# Patient Record
Sex: MALE | Race: WHITE | Employment: OTHER | URBAN - METROPOLITAN AREA
[De-identification: names, ages, dates, MRNs, and addresses within clinical notes are randomized per-mention and may not be internally consistent; named-entity substitution may affect disease eponyms.]

---

## 2018-03-20 ENCOUNTER — HOSPITAL ENCOUNTER (EMERGENCY)
Age: 73
Discharge: HOME | End: 2018-03-20
Attending: EMERGENCY MEDICINE
Payer: MEDICARE

## 2018-03-20 ENCOUNTER — APPOINTMENT (OUTPATIENT)
Dept: CT IMAGING | Age: 73
End: 2018-03-20
Payer: MEDICARE

## 2018-03-20 ENCOUNTER — APPOINTMENT (OUTPATIENT)
Dept: GENERAL RADIOLOGY | Age: 73
End: 2018-03-20
Payer: MEDICARE

## 2018-03-20 VITALS
TEMPERATURE: 98.1 F | BODY MASS INDEX: 28.79 KG/M2 | DIASTOLIC BLOOD PRESSURE: 74 MMHG | RESPIRATION RATE: 16 BRPM | OXYGEN SATURATION: 92 % | HEIGHT: 68 IN | HEART RATE: 77 BPM | WEIGHT: 190 LBS | SYSTOLIC BLOOD PRESSURE: 124 MMHG

## 2018-03-20 DIAGNOSIS — J18.9 PNEUMONIA DUE TO ORGANISM: Primary | ICD-10-CM

## 2018-03-20 LAB
ALBUMIN SERPL-MCNC: 3.9 G/DL (ref 3.5–5.2)
ALP BLD-CCNC: 62 U/L (ref 40–129)
ALT SERPL-CCNC: 14 U/L (ref 0–40)
ANION GAP SERPL CALCULATED.3IONS-SCNC: 12 MMOL/L (ref 7–16)
AST SERPL-CCNC: 18 U/L (ref 0–39)
BASOPHILS ABSOLUTE: 0.06 E9/L (ref 0–0.2)
BASOPHILS RELATIVE PERCENT: 0.8 % (ref 0–2)
BILIRUB SERPL-MCNC: 1.2 MG/DL (ref 0–1.2)
BUN BLDV-MCNC: 19 MG/DL (ref 8–23)
CALCIUM SERPL-MCNC: 8.8 MG/DL (ref 8.6–10.2)
CHLORIDE BLD-SCNC: 98 MMOL/L (ref 98–107)
CO2: 25 MMOL/L (ref 22–29)
CREAT SERPL-MCNC: 1.2 MG/DL (ref 0.7–1.2)
D DIMER: 872 NG/ML DDU
EOSINOPHILS ABSOLUTE: 0.15 E9/L (ref 0.05–0.5)
EOSINOPHILS RELATIVE PERCENT: 2 % (ref 0–6)
GFR AFRICAN AMERICAN: >60
GFR NON-AFRICAN AMERICAN: 60 ML/MIN/1.73
GLUCOSE BLD-MCNC: 89 MG/DL (ref 74–109)
HCT VFR BLD CALC: 45.8 % (ref 37–54)
HEMOGLOBIN: 15.3 G/DL (ref 12.5–16.5)
IMMATURE GRANULOCYTES #: 0.03 E9/L
IMMATURE GRANULOCYTES %: 0.4 % (ref 0–5)
LACTIC ACID: 1 MMOL/L (ref 0.5–2.2)
LYMPHOCYTES ABSOLUTE: 1.17 E9/L (ref 1.5–4)
LYMPHOCYTES RELATIVE PERCENT: 16 % (ref 20–42)
MCH RBC QN AUTO: 29.9 PG (ref 26–35)
MCHC RBC AUTO-ENTMCNC: 33.4 % (ref 32–34.5)
MCV RBC AUTO: 89.6 FL (ref 80–99.9)
MONOCYTES ABSOLUTE: 0.47 E9/L (ref 0.1–0.95)
MONOCYTES RELATIVE PERCENT: 6.4 % (ref 2–12)
NEUTROPHILS ABSOLUTE: 5.44 E9/L (ref 1.8–7.3)
NEUTROPHILS RELATIVE PERCENT: 74.4 % (ref 43–80)
PDW BLD-RTO: 13.3 FL (ref 11.5–15)
PLATELET # BLD: 199 E9/L (ref 130–450)
PMV BLD AUTO: 9.8 FL (ref 7–12)
POTASSIUM SERPL-SCNC: 4 MMOL/L (ref 3.5–5)
RBC # BLD: 5.11 E12/L (ref 3.8–5.8)
SODIUM BLD-SCNC: 135 MMOL/L (ref 132–146)
TOTAL PROTEIN: 6.8 G/DL (ref 6.4–8.3)
WBC # BLD: 7.3 E9/L (ref 4.5–11.5)

## 2018-03-20 PROCEDURE — 71045 X-RAY EXAM CHEST 1 VIEW: CPT

## 2018-03-20 PROCEDURE — 6360000004 HC RX CONTRAST MEDICATION: Performed by: RADIOLOGY

## 2018-03-20 PROCEDURE — 71275 CT ANGIOGRAPHY CHEST: CPT

## 2018-03-20 PROCEDURE — 87040 BLOOD CULTURE FOR BACTERIA: CPT

## 2018-03-20 PROCEDURE — 80053 COMPREHEN METABOLIC PANEL: CPT

## 2018-03-20 PROCEDURE — 83605 ASSAY OF LACTIC ACID: CPT

## 2018-03-20 PROCEDURE — 85378 FIBRIN DEGRADE SEMIQUANT: CPT

## 2018-03-20 PROCEDURE — 99284 EMERGENCY DEPT VISIT MOD MDM: CPT

## 2018-03-20 PROCEDURE — 85025 COMPLETE CBC W/AUTO DIFF WBC: CPT

## 2018-03-20 RX ORDER — LEVOFLOXACIN 750 MG/1
750 TABLET ORAL DAILY
Qty: 10 TABLET | Refills: 0 | Status: SHIPPED | OUTPATIENT
Start: 2018-03-20 | End: 2018-03-30

## 2018-03-20 RX ADMIN — IOPAMIDOL 70 ML: 755 INJECTION, SOLUTION INTRAVENOUS at 16:48

## 2018-03-20 ASSESSMENT — ENCOUNTER SYMPTOMS
COUGH: 1
SINUS PRESSURE: 0
SINUS CONGESTION: 0
EYE DISCHARGE: 0
EYE REDNESS: 0
WHEEZING: 0
VOMITING: 0
EYE PAIN: 0
ABDOMINAL PAIN: 0
DIARRHEA: 0
BACK PAIN: 0
NAUSEA: 0
SORE THROAT: 0
SHORTNESS OF BREATH: 0

## 2018-03-20 NOTE — ED NOTES
Bed: 08  Expected date:   Expected time:   Means of arrival:   Comments:  triage     Nick Austin, RN  03/20/18 3735

## 2018-03-20 NOTE — ED PROVIDER NOTES
negative. Physical Exam   Constitutional: He is oriented to person, place, and time. He appears well-developed and well-nourished. HENT:   Head: Normocephalic and atraumatic. Eyes: Conjunctivae are normal.   Neck: Normal range of motion. Neck supple. Cardiovascular: Normal rate, regular rhythm and normal heart sounds. No murmur heard. Pulmonary/Chest: Breath sounds normal. No respiratory distress. He has no wheezes. He has no rales. Fine crackles L > R     Abdominal: Soft. Bowel sounds are normal. There is no tenderness. There is no rebound and no guarding. Musculoskeletal: He exhibits no edema, tenderness or deformity. Neurological: He is alert and oriented to person, place, and time. No cranial nerve deficit. Coordination normal.   Skin: Skin is warm and dry. Nursing note and vitals reviewed. Procedures    MDM  Number of Diagnoses or Management Options  Pneumonia due to organism:   Diagnosis management comments: 37-year-old male presenting by PCP recommendation for hemoptysis. Primary concern was coronary embolism. Patient's d-dimer was checked and found to be elevated beyond age-adjusted criteria. CT of the chest showed no evidence of point embolism, but did reveal a pneumonia. The patient be treated with Levaquin for this. Labs and vitals are stable during the patient's emergency department course. The patient was given instructions for follow-up and return to the emergency department as needed. ED Course        --------------------------------------------- PAST HISTORY ---------------------------------------------  Past Medical History:  has no past medical history on file. Past Surgical History:  has a past surgical history that includes knee surgery (Bilateral); Rotator cuff repair (Right); and Carpal tunnel release (Bilateral). Social History:  reports that he has quit smoking. He has never used smokeless tobacco. He reports that he drinks alcohol.  He reports that he mouth daily for 10 days       Diagnosis:  1. Pneumonia due to organism        Disposition:  Patient's disposition: Discharge to home  Patient's condition is stable.        Matthew Aponte  Resident  03/20/18 [id number]

## 2018-03-25 LAB
BLOOD CULTURE, ROUTINE: NORMAL
CULTURE, BLOOD 2: NORMAL

## 2020-01-13 ENCOUNTER — HOSPITAL ENCOUNTER (OUTPATIENT)
Dept: GENERAL RADIOLOGY | Age: 75
Discharge: HOME | End: 2020-01-15
Payer: MEDICARE

## 2020-01-13 ENCOUNTER — HOSPITAL ENCOUNTER (OUTPATIENT)
Age: 75
Discharge: HOME | End: 2020-01-15
Payer: MEDICARE

## 2020-01-13 PROCEDURE — 72110 X-RAY EXAM L-2 SPINE 4/>VWS: CPT

## 2020-12-07 ENCOUNTER — OFFICE VISIT (OUTPATIENT)
Dept: ORTHOPEDIC SURGERY | Age: 75
End: 2020-12-07
Payer: MEDICARE

## 2020-12-07 ENCOUNTER — EVALUATION (OUTPATIENT)
Dept: PHYSICAL THERAPY | Age: 75
End: 2020-12-07
Payer: MEDICARE

## 2020-12-07 VITALS — HEIGHT: 68 IN | WEIGHT: 185 LBS | BODY MASS INDEX: 28.04 KG/M2 | TEMPERATURE: 96.9 F

## 2020-12-07 PROBLEM — M19.011 PRIMARY OSTEOARTHRITIS OF RIGHT SHOULDER: Status: ACTIVE | Noted: 2020-12-07

## 2020-12-07 PROBLEM — M17.12 PATELLOFEMORAL ARTHRITIS OF LEFT KNEE: Status: ACTIVE | Noted: 2020-12-07

## 2020-12-07 PROCEDURE — L1810 KO ELASTIC WITH JOINTS: HCPCS | Performed by: ORTHOPAEDIC SURGERY

## 2020-12-07 PROCEDURE — 99204 OFFICE O/P NEW MOD 45 MIN: CPT | Performed by: ORTHOPAEDIC SURGERY

## 2020-12-07 PROCEDURE — 97161 PT EVAL LOW COMPLEX 20 MIN: CPT | Performed by: PHYSICAL THERAPIST

## 2020-12-07 RX ORDER — ACETAMINOPHEN 160 MG
1 TABLET,DISINTEGRATING ORAL DAILY
COMMUNITY

## 2020-12-07 NOTE — PROGRESS NOTES
[address] [facility]   Phone: [phone]     Fax: [phone]    Physical Therapy Initial Evaluation  Date:  2020    Patient Name:  Neel Mabry    :  1945  MRN: [id number]  Referring Physician:  Maynor Cueto Information:  [email or web address]     Evaluation date:  20  Diagnosis:  OA L knee/R shoulder  Treatment diagnosis:  OA Lknee; OA R shoulder  Precautions:  none noted   Cert Dates:  20/3/77  to  3/7/20  ICD-10 Codes:  M25.519, M25.569, R29.90  Evaluating Physical Therapist:  VIJAY Lombardo       Subjective:  History/Onset of Symptoms:  pt presents to therapy with c/o R shoulder/L knee pain due to OA for several yrs of insidious onset; PMH for R shoulder arthoscopy  as well as arthroscopies x 4 L knee with last being ; tells PT he has been wearing brace L  knee for a while and feels better; no c/o N/T or buckling L LE or R UE; MEDS help with both; sleep hampered due to pain; x-ray of both + for OA and pt has been told sx in imminent for R shoulder; RTD for follow-up 21    Pain:  8/10 across R shoulder; 2/10 L knee with all activities       Sensation:  intact B UE/LONDON's     Previous methods of Treatment:  arthroscopic sx R shoulder, L knee x 4     Additional Comments:  pt static/dynamic balance is GOOD/GOOD+      Objective:    AROM:  WNL for all ranges L knee; R shoulder:  flex/abd= 90*/80*, IR/ER= belt/back of head  PROM R shoulder:  flex/abd= 110*/90*, IR/ER= 60*/30*     Strength:  grossly 4/5 for all ranges L hip/knee; grossly 3, 3+/5 across R shoulder all available AROM's      Gait:  normal gait mechanics noted B LONDON's     Additional Comments:  R shoulder:  empty can/drop arm/short abd    -/-/- for weakness       Summary/Assessment:    Pt presents to outpatient physical therapy with decreased AROM/strength R shoulder, decreased strength L hip/knee for all ranges.       Plan:     Below checked are areas for improvement during physical therapy POC:        [x] Pain reduction  [x]  Balance Improvement       [x]  Strengthening  []  Postural Improvement   [x]  Range of Motion  []  Soft Tissue Improvement    []  Gait Training   [x]  Other:  modalities as needed     [x]  Home Exercise Program      Pt will be see for 2 visits per week for 4 weeks for a total of 8 visits to accomplish goals set below:         Goals: (4 weeks)      1. Pain:  decrease pain across L knee, 0-2/10 and R shoulder, 0-4//10 with all prolonged activities    2. Increase strength across L hip/knee to grossly 5/5 and R shoulder to grossly 4-/5 within all available AROM's    3. Improve AROM across R shoulder:  flex/abd= 110*/110*, IR/ER= L5/C4    4. Improve endurance for all prolonged activities to GOOD    5. Assure I with HEP for home management of conditions        Pt's potential for reaching Physical Therapy goals: GOOD with L knee, FAIR/FAIR+ R shoulder. Time In:  1331  Time Out:  [address] [facility], MPT     [address]: [phone]               F:   [phone]     If you have any questions or concerns, please don't hesitate to call. Thank you for your referral.    Physician Signature:________________________________Date:__________________  By signing above, therapists plan is approved by physician. All patients under [employer]   must be signed by physician.

## 2020-12-07 NOTE — PROGRESS NOTES
.kneePhysical Therapy Daily Treatment Note    Date: 2020  Patient Name: Anuj Greer  :    MRN: [id number]    Referring Provider: Flaco Bright MD  [address]     Medical Diagnosis:     OA L knee/R shoulder    Outcome Measure:  Lower Extremity Functional Scale (LEFS) 20-39%% impairment                                      Quick Dash  40-59%  S: See eval  O: Mod edema  Time  8231-8790       Visit   Repeat outcome measure at mid point and end. Pain    8/10 shoulder; 2/10 knee     ROM       Modalities       Ice, compression, elevation      Stretching       Patella mobs      Prone hangs      Heel props      Knee flex stretch-seated      Prone self flexion stretch      Exercise       Nustep       Quad sets      Heel slides      SLR      Marching       Sidekicks      Squat       Step-ups - FWD       Step-ups - LAT      Step-ups - BWD       Step up and over reciprocally       TG squats      TG Calf raises            UBE            pulleys for flex/abd      table st:  flex                      abd                      ER      pendulums      shrugs/scap ret      supine wand flex                   LAQ             NMR For safe ambulation in community and home    Marching gait      Side stepping      Retrowalk      Heel to toe      A: Tolerated well. Above added to written HEP along with instructions for ice and elevation.   P: Continue with rehab plan  Kim Choudhury PT    Treatment Charges: Mins Units   Initial Evaluation 26 1   Re-Evaluation     Ther Exercise         TE     Manual Therapy     MT     Ther Activities        TA     Gait Training          GT     Neuro Re-education NR     Modalities     Non-Billable Service Time     Other     Total Time/Units 26 1

## 2020-12-07 NOTE — PROGRESS NOTES
Chief Complaint:   Chief Complaint   Patient presents with    Knee Pain     Lt knee pain, No recent X-rays. Hx of bilateral knee scopes    Shoulder Pain     Rt shoulder pain, no recent X-rays       HPI   68-year-old man here for evaluation of left knee and right shoulder pain. Both problems are chronic. Left knee variable anterior and medial discomfort. Not taking any significant medications. History of previous knee arthroscopy. No new injuries. Right shoulder pain is chronic. History of arthroscopy 1989 by another surgeon. States that he has had several steroid injections over the years and they were never helpful for him. Patient Active Problem List   Diagnosis    Patellofemoral arthritis of left knee    Primary osteoarthritis of right shoulder       History reviewed. No pertinent past medical history. Past Surgical History:   Procedure Laterality Date    CARPAL TUNNEL RELEASE Bilateral     KNEE SURGERY Bilateral     ROTATOR CUFF REPAIR Right        Current Outpatient Medications   Medication Sig Dispense Refill    Multiple Vitamins-Minerals (MULTIVITAMIN ADULT PO) Take 1 capsule by mouth daily      ZINC PO Take 1 capsule by mouth daily      Cholecalciferol (VITAMIN D3) 50 MCG (2000 UT) CAPS Take 1 capsule by mouth daily       No current facility-administered medications for this visit.         No Known Allergies    Social History     Socioeconomic History    Marital status:      Spouse name: None    Number of children: None    Years of education: None    Highest education level: None   Occupational History    None   Social Needs    Financial resource strain: None    Food insecurity     Worry: None     Inability: None    Transportation needs     Medical: None     Non-medical: None   Tobacco Use    Smoking status: Former Smoker    Smokeless tobacco: Never Used   Substance and Sexual Activity    Alcohol use: Yes     Comment: rarely    Drug use: No    Sexual activity: None   Lifestyle    Physical activity     Days per week: None     Minutes per session: None    Stress: None   Relationships    Social connections     Talks on phone: None     Gets together: None     Attends Scientologist service: None     Active member of club or organization: None     Attends meetings of clubs or organizations: None     Relationship status: None    Intimate partner violence     Fear of current or ex partner: None     Emotionally abused: None     Physically abused: None     Forced sexual activity: None   Other Topics Concern    None   Social History Narrative    None       History reviewed. No pertinent family history. Review of Systems   No fever, chills, or other constitutionalsymptoms. No numbness or other neuro symptoms. No chest pain. No dyspnea. [unfilled]   Left knee has good overall alignment. No acute effusion. Anterior and medial discomfort with full range of motion. No pain with left hip rotation. Right shoulder has no effusion or acute erythema. He is significantly limited in motion to about 80 degrees of flexion, 75 degrees of abduction. Internal rotation to the posterior lateral hip. External rotation 30 degrees. Gross abduction strength 5/5. Physical Exam    Patient is alert and oriented. Well-developed well-nourished. BMI 28. Pupils equal and reactive. Scleraeanicteric. Neck supple  Lungs clear. Cardiac rate and rhythm regular. Abdomen soft and nontender. Skin warm and dry. XRAY:   Knee x-rays today including bilateral standing AP flexion weightbearing and lateral view of the left knee. Left knee demonstrates stage III patellofemoral degenerative change with narrowing osteophyte formation and patellar sclerosis. There is very mild medial joint space wearing in both the AP and flexion weightbearing views. Right knee is relatively preserved. Impression: Patellofemoral arthritis left knee.     AP and lateral x-ray views right shoulder

## 2020-12-17 ENCOUNTER — TREATMENT (OUTPATIENT)
Dept: PHYSICAL THERAPY | Age: 75
End: 2020-12-17
Payer: MEDICARE

## 2020-12-17 PROCEDURE — 97110 THERAPEUTIC EXERCISES: CPT | Performed by: PHYSICAL THERAPIST

## 2020-12-17 PROCEDURE — 97530 THERAPEUTIC ACTIVITIES: CPT | Performed by: PHYSICAL THERAPIST

## 2021-01-28 ENCOUNTER — OFFICE VISIT (OUTPATIENT)
Dept: ORTHOPEDIC SURGERY | Age: 76
End: 2021-01-28
Payer: MEDICARE

## 2021-01-28 VITALS — WEIGHT: 185 LBS | TEMPERATURE: 97.1 F | RESPIRATION RATE: 20 BRPM | BODY MASS INDEX: 27.4 KG/M2 | HEIGHT: 69 IN

## 2021-01-28 DIAGNOSIS — G89.29 CHRONIC RIGHT SHOULDER PAIN: Primary | ICD-10-CM

## 2021-01-28 DIAGNOSIS — M19.011 PRIMARY OSTEOARTHRITIS OF RIGHT SHOULDER: ICD-10-CM

## 2021-01-28 DIAGNOSIS — M25.511 CHRONIC RIGHT SHOULDER PAIN: Primary | ICD-10-CM

## 2021-01-28 PROCEDURE — 99214 OFFICE O/P EST MOD 30 MIN: CPT | Performed by: ORTHOPAEDIC SURGERY

## 2021-01-28 NOTE — PROGRESS NOTES
Department of Orthopedic Surgery  [facility] Bella Ambriz MD  History and Physical      CHIEF COMPLAINT: Right shoulder pain    HISTORY OF PRESENT ILLNESS:                The patient is a 70 y.o. male who presents with persistent recalcitrant right shoulder pain. Patient reports a several year history of worsening right shoulder pain. He is now reporting loss of range of motion in addition to his pain. Has had multiple cortisone injections into his right shoulder without any significant pain improvement. Pain is worsened with activities particular overhead lifting. He is avoiding certain activities such as gun shooting which he enjoys secondary the pain in the shoulder. 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 Reports no heavy repetitive use of the shoulder. He has not had any other treatments. He does have a history of a right previous rotator cuff repair which she reports persistent pain afterwards. .    Past Medical History:    History reviewed. No pertinent past medical history. Past Surgical History:        Procedure Laterality Date    CARPAL TUNNEL RELEASE Bilateral     KNEE SURGERY Bilateral     ROTATOR CUFF REPAIR Right      Current Medications:   No current facility-administered medications for this visit. Allergies:  Patient has no known allergies. Social History:   TOBACCO:   reports that he has quit smoking. He has never used smokeless tobacco.  ETOH:   reports current alcohol use. DRUGS:   reports no history of drug use. ACTIVITIES OF DAILY LIVING:    OCCUPATION:    Family History:   History reviewed. No pertinent family history.     REVIEW OF SYSTEMS:  CONSTITUTIONAL:  negative  EYES:  negative  HEENT:  negative  RESPIRATORY:  negative  CARDIOVASCULAR:  negative  GASTROINTESTINAL:  negative  GENITOURINARY:  negative  INTEGUMENT/BREAST:  negative  HEMATOLOGIC/LYMPHATIC:  negative  ALLERGIC/IMMUNOLOGIC:  negative  ENDOCRINE:  negative  MUSCULOSKELETAL: Right shoulder arthritis and pain  NEUROLOGICAL:  negative  BEHAVIOR/PSYCH:  negative    PHYSICAL EXAM:    VITALS:  Temp 97.1 °F (36.2 °C) (Infrared)   Resp 20   Ht 5' 9\" (1.753 m)   Wt 185 lb (83.9 kg)   BMI 27.32 kg/m²   CONSTITUTIONAL:  awake, alert, cooperative, no apparent distress, and appears stated age  EYES:  Lids and lashes normal, pupils equal, round and reactive to light, extra ocular muscles intact, sclera clear, conjunctiva normal  ENT:  Normocephalic, without obvious abnormality, atraumatic, sinuses nontender on palpation, external ears without lesions, oral pharynx with moist mucus membranes, tonsils without erythema or exudates, gums normal and good dentition. NECK:  Supple, symmetrical, trachea midline, no adenopathy, thyroid symmetric, not enlarged and no tenderness, skin normal  LUNGS:  CTA  CARDIOVASCULAR:  RRR  ABDOMEN:  Soft,nttp  CHEST/BREASTS:  deferred  GENITAL/URINARY:  deferred  NEUROLOGIC:  Awake, alert, oriented to name, place and time. Cranial nerves II-XII are grossly intact. Motor is 5 out of 5 bilaterally. Sensory is intact. gait is normal.  MUSCULOSKELETAL:    Right upper extremity: Diffusely tender to palpation anterior and lateral aspect of the shoulder. External rotation 0 degrees. Internal rotation to the lateral thigh. Forward elevation limited to 80 degrees. Abduction 60 degrees. Negative drop arm test.  Nontender about the elbow and wrist.  Radial, ulnar, median and axillary nerve sensation intact. 5/5 deltoid, biceps, tricep, wrist flexion extensors digital intrinsics. 2+ radial pulse.   Brisk cap refill of digits    DATA:    CBC:   Lab Results   Component Value Date    WBC 7.3 03/20/2018    RBC 5.11 03/20/2018    HGB 15.3 03/20/2018    HCT 45.8 03/20/2018    MCV 89.6 03/20/2018    MCH 29.9 03/20/2018    MCHC 33.4 03/20/2018    RDW 13.3 03/20/2018     03/20/2018    MPV 9.8 03/20/2018     PT/INR:  No results found for: PROTIME, INR    Radiology Review: X-rays of the right shoulder AP and axillary views demonstrate advanced primary glenohumeral joint arthritis    IMPRESSION:  · End-stage right shoulder glenohumeral joint arthritis  · History of previous rotator cuff repair right shoulder      PLAN:  Today's findings were explained the patient. I did use bone models to demonstrate total shoulder arthroplasty. Explained surgical intervention in detail. After discussion he would like to proceed with surgical invention. Plan for right total shoulder arthroplasty with convertible glenoid. Discussed possible need for reverse total shoulder arthroplasty depending on the nature of his rotator cuff at the time of his surgery. Postoperative course and expectations were outlined. All questions answered      I explained the risks, benefits, alternatives and complications of surgery with the patient including but not limited to the risks of infection, possible damage to nerves, vessels, or tendons, stiffness, loss of range of motion, scar sensitivity, wound healing complications, worsening symptoms, periprosthetic fractures and/or dislocations, possible need for therapy, as well as the possible need further surgery and unanticipated complications. The patient voiced understanding and all questions were answered. The patient elected to proceed with surgical intervention.      Ashok Lui  1/28/2021

## 2021-01-31 ENCOUNTER — HOSPITAL ENCOUNTER (OUTPATIENT)
Dept: CT IMAGING | Age: 76
Discharge: HOME | End: 2021-02-02
Payer: MEDICARE

## 2021-01-31 DIAGNOSIS — G89.29 CHRONIC RIGHT SHOULDER PAIN: ICD-10-CM

## 2021-01-31 DIAGNOSIS — M25.511 CHRONIC RIGHT SHOULDER PAIN: ICD-10-CM

## 2021-01-31 PROCEDURE — 73200 CT UPPER EXTREMITY W/O DYE: CPT

## 2021-02-17 ENCOUNTER — PREP FOR PROCEDURE (OUTPATIENT)
Dept: ORTHOPEDIC SURGERY | Age: 76
End: 2021-02-17

## 2021-02-17 RX ORDER — SODIUM CHLORIDE 0.9 % (FLUSH) 0.9 %
10 SYRINGE (ML) INJECTION PRN
Status: CANCELLED | OUTPATIENT
Start: 2021-02-17

## 2021-02-17 RX ORDER — SODIUM CHLORIDE 9 MG/ML
INJECTION, SOLUTION INTRAVENOUS CONTINUOUS
Status: CANCELLED | OUTPATIENT
Start: 2021-02-17

## 2021-02-17 RX ORDER — SODIUM CHLORIDE 0.9 % (FLUSH) 0.9 %
10 SYRINGE (ML) INJECTION EVERY 12 HOURS SCHEDULED
Status: CANCELLED | OUTPATIENT
Start: 2021-02-17

## 2021-02-25 ENCOUNTER — IMMUNIZATION (OUTPATIENT)
Dept: PRIMARY CARE CLINIC | Age: 76
End: 2021-02-25
Payer: MEDICARE

## 2021-02-25 PROCEDURE — 0001A COVID-19, PFIZER VACCINE 30MCG/0.3ML DOSE: CPT | Performed by: PHYSICIAN ASSISTANT

## 2021-02-25 PROCEDURE — 91300 COVID-19, PFIZER VACCINE 30MCG/0.3ML DOSE: CPT | Performed by: PHYSICIAN ASSISTANT

## 2021-03-18 ENCOUNTER — IMMUNIZATION (OUTPATIENT)
Dept: PRIMARY CARE CLINIC | Age: 76
End: 2021-03-18
Payer: MEDICARE

## 2021-03-18 PROCEDURE — 91300 COVID-19, PFIZER VACCINE 30MCG/0.3ML DOSE: CPT | Performed by: PHYSICIAN ASSISTANT

## 2021-03-18 PROCEDURE — 0002A COVID-19, PFIZER VACCINE 30MCG/0.3ML DOSE: CPT | Performed by: PHYSICIAN ASSISTANT

## 2021-03-19 ENCOUNTER — HOSPITAL ENCOUNTER (OUTPATIENT)
Dept: PREADMISSION TESTING | Age: 76
Discharge: HOME | End: 2021-03-19
Payer: MEDICARE

## 2021-03-19 ENCOUNTER — HOSPITAL ENCOUNTER (OUTPATIENT)
Age: 76
Discharge: HOME | End: 2021-03-21
Payer: MEDICARE

## 2021-03-19 ENCOUNTER — ANESTHESIA EVENT (OUTPATIENT)
Dept: OPERATING ROOM | Age: 76
End: 2021-03-19
Payer: MEDICARE

## 2021-03-19 VITALS
TEMPERATURE: 97.5 F | HEIGHT: 68 IN | HEART RATE: 70 BPM | OXYGEN SATURATION: 96 % | DIASTOLIC BLOOD PRESSURE: 76 MMHG | WEIGHT: 185 LBS | RESPIRATION RATE: 18 BRPM | SYSTOLIC BLOOD PRESSURE: 142 MMHG | BODY MASS INDEX: 28.04 KG/M2

## 2021-03-19 DIAGNOSIS — Z01.818 PREOP TESTING: ICD-10-CM

## 2021-03-19 LAB
HCT VFR BLD CALC: 43.6 % (ref 37–54)
HEMOGLOBIN: 14.4 G/DL (ref 12.5–16.5)
MCH RBC QN AUTO: 30.1 PG (ref 26–35)
MCHC RBC AUTO-ENTMCNC: 33 % (ref 32–34.5)
MCV RBC AUTO: 91.2 FL (ref 80–99.9)
PDW BLD-RTO: 12.9 FL (ref 11.5–15)
PLATELET # BLD: 199 E9/L (ref 130–450)
PMV BLD AUTO: 9.7 FL (ref 7–12)
RBC # BLD: 4.78 E12/L (ref 3.8–5.8)
WBC # BLD: 6 E9/L (ref 4.5–11.5)

## 2021-03-19 PROCEDURE — 36415 COLL VENOUS BLD VENIPUNCTURE: CPT

## 2021-03-19 PROCEDURE — 85027 COMPLETE CBC AUTOMATED: CPT

## 2021-03-19 PROCEDURE — 87081 CULTURE SCREEN ONLY: CPT

## 2021-03-19 PROCEDURE — U0003 INFECTIOUS AGENT DETECTION BY NUCLEIC ACID (DNA OR RNA); SEVERE ACUTE RESPIRATORY SYNDROME CORONAVIRUS 2 (SARS-COV-2) (CORONAVIRUS DISEASE [COVID-19]), AMPLIFIED PROBE TECHNIQUE, MAKING USE OF HIGH THROUGHPUT TECHNOLOGIES AS DESCRIBED BY CMS-2020-01-R: HCPCS

## 2021-03-19 RX ORDER — DEXAMETHASONE SODIUM PHOSPHATE 10 MG/ML
4 INJECTION, SOLUTION INTRAMUSCULAR; INTRAVENOUS ONCE
Status: CANCELLED | OUTPATIENT
Start: 2021-03-19 | End: 2021-03-19

## 2021-03-19 RX ORDER — ROPIVACAINE HYDROCHLORIDE 5 MG/ML
30 INJECTION, SOLUTION EPIDURAL; INFILTRATION; PERINEURAL
Status: CANCELLED | OUTPATIENT
Start: 2021-03-19 | End: 2021-03-19

## 2021-03-19 RX ORDER — FENTANYL CITRATE 50 UG/ML
100 INJECTION, SOLUTION INTRAMUSCULAR; INTRAVENOUS ONCE
Status: CANCELLED | OUTPATIENT
Start: 2021-03-19 | End: 2021-03-19

## 2021-03-19 RX ORDER — MIDAZOLAM HYDROCHLORIDE 2 MG/2ML
1 INJECTION, SOLUTION INTRAMUSCULAR; INTRAVENOUS EVERY 5 MIN PRN
Status: CANCELLED | OUTPATIENT
Start: 2021-03-19

## 2021-03-19 RX ORDER — LIDOCAINE HYDROCHLORIDE 10 MG/ML
10 INJECTION, SOLUTION INFILTRATION; PERINEURAL
Status: CANCELLED | OUTPATIENT
Start: 2021-03-19 | End: 2021-03-19

## 2021-03-19 ASSESSMENT — PAIN DESCRIPTION - ORIENTATION: ORIENTATION: RIGHT

## 2021-03-19 ASSESSMENT — PAIN DESCRIPTION - FREQUENCY: FREQUENCY: CONTINUOUS

## 2021-03-19 ASSESSMENT — PAIN SCALES - GENERAL: PAINLEVEL_OUTOF10: 5

## 2021-03-19 ASSESSMENT — PAIN DESCRIPTION - LOCATION: LOCATION: SHOULDER

## 2021-03-19 ASSESSMENT — PAIN DESCRIPTION - ONSET: ONSET: AWAKENED FROM SLEEP

## 2021-03-19 ASSESSMENT — PAIN DESCRIPTION - PAIN TYPE: TYPE: CHRONIC PAIN

## 2021-03-19 NOTE — ANESTHESIA PRE PROCEDURE
Department of Anesthesiology  Preprocedure Note       Name:  Osiris Scott. Age:  70 y.o.  :  1945                                          MRN:  [id number]         Date:  3/19/2021      Surgeon: Eloy Early):  Krystina Daly MD    Procedure: Procedure(s):  RIGHT TOTAL SHOULDER ARTHROPLASTY POSSIBLE REVERSE   +++BIOMET+++    Medications prior to admission:   Prior to Admission medications    Medication Sig Start Date End Date Taking? Authorizing Provider   Multiple Vitamins-Minerals (MULTIVITAMIN ADULT PO) Take 1 capsule by mouth daily   Yes Historical Provider, MD   ZINC PO Take 1 capsule by mouth daily   Yes Historical Provider, MD   Cholecalciferol (VITAMIN D3) 50 MCG ( UT) CAPS Take 1 capsule by mouth daily   Yes Historical Provider, MD       Current medications:    Current Outpatient Medications   Medication Sig Dispense Refill    Multiple Vitamins-Minerals (MULTIVITAMIN ADULT PO) Take 1 capsule by mouth daily      ZINC PO Take 1 capsule by mouth daily      Cholecalciferol (VITAMIN D3) 50 MCG ( UT) CAPS Take 1 capsule by mouth daily       No current facility-administered medications for this encounter.         Allergies:  No Known Allergies    Problem List:    Patient Active Problem List   Diagnosis Code    Patellofemoral arthritis of left knee M17.12    Primary osteoarthritis of right shoulder M19.011       Past Medical History:        Diagnosis Date    Shoulder pain, right     For OR 3-24-21       Past Surgical History:        Procedure Laterality Date    CARPAL TUNNEL RELEASE Bilateral     KNEE SURGERY Bilateral     ROTATOR CUFF REPAIR Right     TONSILLECTOMY         Social History:    Social History     Tobacco Use    Smoking status: Former Smoker     Packs/day: 1.00     Years: 20.00     Pack years: 20.00     Types: Cigarettes     Quit date:      Years since quittin.2    Smokeless tobacco: Never Used   Substance Use Topics    Alcohol use: Yes     Comment: rarely                                Counseling given: Not Answered      Vital Signs (Current):   Vitals:    03/19/21 0952   BP: (!) 142/76   Pulse: 70   Resp: 18   Temp: 97.5 °F (36.4 °C)   TempSrc: Infrared   SpO2: 96%   Weight: 185 lb (83.9 kg)   Height: 5' 8\" (1.727 m)                                              BP Readings from Last 3 Encounters:   03/19/21 (!) 142/76   03/20/18 124/74       NPO Status:                                                                                 BMI:   Wt Readings from Last 3 Encounters:   03/19/21 185 lb (83.9 kg)   01/28/21 185 lb (83.9 kg)   12/07/20 185 lb (83.9 kg)     Body mass index is 28.13 kg/m². CBC:   Lab Results   Component Value Date    WBC 6.0 03/19/2021    RBC 4.78 03/19/2021    HGB 14.4 03/19/2021    HCT 43.6 03/19/2021    MCV 91.2 03/19/2021    RDW 12.9 03/19/2021     03/19/2021       CMP:   Lab Results   Component Value Date     03/20/2018    K 4.0 03/20/2018    CL 98 03/20/2018    CO2 25 03/20/2018    BUN 19 03/20/2018    CREATININE 1.2 03/20/2018    GFRAA >60 03/20/2018    LABGLOM 60 03/20/2018    GLUCOSE 89 03/20/2018    PROT 6.8 03/20/2018    CALCIUM 8.8 03/20/2018    BILITOT 1.2 03/20/2018    ALKPHOS 62 03/20/2018    AST 18 03/20/2018    ALT 14 03/20/2018       POC Tests: No results for input(s): POCGLU, POCNA, POCK, POCCL, POCBUN, POCHEMO, POCHCT in the last 72 hours.     Coags: No results found for: PROTIME, INR, APTT    HCG (If Applicable): No results found for: PREGTESTUR, PREGSERUM, HCG, HCGQUANT     ABGs: No results found for: PHART, PO2ART, BOV2URM, KBI1UPU, BEART, L6IUULWV     Type & Screen (If Applicable):  No results found for: LABABO, LABRH    Drug/Infectious Status (If Applicable):  No results found for: HIV, HEPCAB    COVID-19 Screening (If Applicable): No results found for: COVID19        Anesthesia Evaluation  Patient summary reviewed no history of anesthetic complications:   Airway: Mallampati: II  TM distance: >3 FB

## 2021-03-19 NOTE — PROGRESS NOTES
[facility] PRE-ADMISSION TESTING INSTRUCTIONS    The Preadmission Testing patient is instructed accordingly using the following criteria (check applicable):    ARRIVAL INSTRUCTIONS:  [x] Parking the day of Surgery is located in the Main Entrance lot. Upon entering the door, make an immediate right to the surgery reception desk    [x] Bring photo ID and insurance card    [] Bring in a copy of Living will or Durable Power of  papers. [x] Please be sure to arrange transportation to and from the hospital    [x] Please arrange for someone to be with you the remainder of the day due to having anesthesia      GENERAL INSTRUCTIONS:    [x] Nothing by mouth after midnight, including gum, candy, mints or water    [x] You may brush your teeth, but do not swallow any water    [] Take medications as instructed with 1-2 oz of water    [x] Stop herbal supplements and vitamins 5 days prior to procedure    [x] Follow preop dosing of blood thinners per physician instructions    [] Do not take insulin or oral diabetic medications    [] If diabetic and have low blood sugar or feel symptomatic, take 1-2oz apple juice or glucose tablets    [] Bring inhalers day of surgery    [] Bring C-PAP/ Bi-Pap day of surgery    [] Bring urine specimen day of surgery    [] Antibacterial Soap shower or bath AM of Surgery, no lotion, powders or creams to surgical site    [] Follow bowel prep as instructed per surgeon    [] No tobacco products within 24 hours of surgery     [x] No alcohol or illegal drug use within 24 hours of surgery.     [x] Jewelry, body piercing's, eyeglasses, contact lenses and dentures are not permitted into surgery (bring cases)      [] Please do not wear any nail polish or make up on the day of surgery    [x] If not already done, you can expect a call from registration    [x] If surgeon requests a time change you will be notified the day prior to surgery    [x] If you receive a survey after surgery we would greatly appreciate your comments    [] Parent/guardian of a minor must accompany their child and remain on the premises  the entire time they are under our care     [] Pediatric patients may bring favorite toy, blanket or comfort item with them    [] A caregiver or family member must remain with the patient during their stay if they are mentally handicapped, have dementia, disoriented or unable to use a call light or would be a safety concern if left unattended    [x] Please notify surgeon if you develop any illness between now and time of surgery (cold, cough, sore throat, fever, nausea, vomiting) or any signs of infections  including skin, wounds, and dental.    [] Other instructions    EDUCATIONAL MATERIALS PROVIDED:    [x] PAT Preoperative Education Packet/Booklet     [x] Medication List    [] Fluoroscopy Information Pamphlet    [] Transfusion bracelet applied with instructions    [] Joint replacement video reviewed    [x] Shower with antibacterial soap and use CHG wipes provided the evening before surgery as instructed        Have you been tested for COVID  No           Have you been told you were positive for COVID No  Have you had any known exposure to someone that is positive for COVID No  Do you have a cough                   No              Do you have shortness of breath No                 Do you have a sore throat            No                Are you having chills                    No                Are you having muscle aches. No                    Please come to the hospital wearing a mask and have your significant other wear a mask as well. Both of you should check your temperature before leaving to come here,  if it is 100 or higher please call [phone] for instruction.

## 2021-03-20 LAB
MRSA CULTURE ONLY: NORMAL
SARS-COV-2, PCR: NOT DETECTED

## 2021-03-24 ENCOUNTER — ANESTHESIA (OUTPATIENT)
Dept: OPERATING ROOM | Age: 76
End: 2021-03-24
Payer: MEDICARE

## 2021-03-24 ENCOUNTER — HOSPITAL ENCOUNTER (OUTPATIENT)
Age: 76
Setting detail: OUTPATIENT SURGERY
Discharge: HOME | End: 2021-03-24
Attending: ORTHOPAEDIC SURGERY | Admitting: ORTHOPAEDIC SURGERY
Payer: MEDICARE

## 2021-03-24 VITALS
BODY MASS INDEX: 28.04 KG/M2 | HEART RATE: 78 BPM | WEIGHT: 185 LBS | SYSTOLIC BLOOD PRESSURE: 133 MMHG | TEMPERATURE: 96.8 F | HEIGHT: 68 IN | RESPIRATION RATE: 16 BRPM | OXYGEN SATURATION: 96 % | DIASTOLIC BLOOD PRESSURE: 66 MMHG

## 2021-03-24 VITALS
RESPIRATION RATE: 1 BRPM | SYSTOLIC BLOOD PRESSURE: 126 MMHG | DIASTOLIC BLOOD PRESSURE: 73 MMHG | OXYGEN SATURATION: 94 %

## 2021-03-24 DIAGNOSIS — Z01.818 PREOP TESTING: Primary | ICD-10-CM

## 2021-03-24 DIAGNOSIS — G89.18 POST-OP PAIN: ICD-10-CM

## 2021-03-24 PROCEDURE — 88304 TISSUE EXAM BY PATHOLOGIST: CPT

## 2021-03-24 PROCEDURE — 3600000015 HC SURGERY LEVEL 5 ADDTL 15MIN: Performed by: ORTHOPAEDIC SURGERY

## 2021-03-24 PROCEDURE — 6360000002 HC RX W HCPCS: Performed by: ANESTHESIOLOGY

## 2021-03-24 PROCEDURE — 64415 NJX AA&/STRD BRCH PLXS IMG: CPT | Performed by: ANESTHESIOLOGY

## 2021-03-24 PROCEDURE — 88311 DECALCIFY TISSUE: CPT

## 2021-03-24 PROCEDURE — 6360000002 HC RX W HCPCS: Performed by: ORTHOPAEDIC SURGERY

## 2021-03-24 PROCEDURE — 6360000002 HC RX W HCPCS: Performed by: PHYSICIAN ASSISTANT

## 2021-03-24 PROCEDURE — 23472 RECONSTRUCT SHOULDER JOINT: CPT | Performed by: ORTHOPAEDIC SURGERY

## 2021-03-24 PROCEDURE — 6360000002 HC RX W HCPCS: Performed by: NURSE ANESTHETIST, CERTIFIED REGISTERED

## 2021-03-24 PROCEDURE — 2500000003 HC RX 250 WO HCPCS: Performed by: ANESTHESIOLOGY

## 2021-03-24 PROCEDURE — 3700000001 HC ADD 15 MINUTES (ANESTHESIA): Performed by: ORTHOPAEDIC SURGERY

## 2021-03-24 PROCEDURE — 2500000003 HC RX 250 WO HCPCS: Performed by: ORTHOPAEDIC SURGERY

## 2021-03-24 PROCEDURE — L3650 SO 8 ABD RESTRAINT PRE OTS: HCPCS | Performed by: ORTHOPAEDIC SURGERY

## 2021-03-24 PROCEDURE — 7100000010 HC PHASE II RECOVERY - FIRST 15 MIN: Performed by: ORTHOPAEDIC SURGERY

## 2021-03-24 PROCEDURE — 3700000000 HC ANESTHESIA ATTENDED CARE: Performed by: ORTHOPAEDIC SURGERY

## 2021-03-24 PROCEDURE — 3600000005 HC SURGERY LEVEL 5 BASE: Performed by: ORTHOPAEDIC SURGERY

## 2021-03-24 PROCEDURE — 2500000003 HC RX 250 WO HCPCS: Performed by: NURSE ANESTHETIST, CERTIFIED REGISTERED

## 2021-03-24 PROCEDURE — 7100000001 HC PACU RECOVERY - ADDTL 15 MIN: Performed by: ORTHOPAEDIC SURGERY

## 2021-03-24 PROCEDURE — C1776 JOINT DEVICE (IMPLANTABLE): HCPCS | Performed by: ORTHOPAEDIC SURGERY

## 2021-03-24 PROCEDURE — 2720000010 HC SURG SUPPLY STERILE: Performed by: ORTHOPAEDIC SURGERY

## 2021-03-24 PROCEDURE — 7100000000 HC PACU RECOVERY - FIRST 15 MIN: Performed by: ORTHOPAEDIC SURGERY

## 2021-03-24 PROCEDURE — 2580000003 HC RX 258: Performed by: NURSE ANESTHETIST, CERTIFIED REGISTERED

## 2021-03-24 PROCEDURE — 2720000002 HC MISC SURG SUPPLY STERILE >$500: Performed by: ORTHOPAEDIC SURGERY

## 2021-03-24 PROCEDURE — 2709999900 HC NON-CHARGEABLE SUPPLY: Performed by: ORTHOPAEDIC SURGERY

## 2021-03-24 PROCEDURE — 7100000011 HC PHASE II RECOVERY - ADDTL 15 MIN: Performed by: ORTHOPAEDIC SURGERY

## 2021-03-24 PROCEDURE — C1713 ANCHOR/SCREW BN/BN,TIS/BN: HCPCS | Performed by: ORTHOPAEDIC SURGERY

## 2021-03-24 DEVICE — SCREW BNE L25MM DIA4.75MM HD DIA3.5MM CORT FIX ANG: Type: IMPLANTABLE DEVICE | Site: SHOULDER | Status: FUNCTIONAL

## 2021-03-24 DEVICE — STEM HUM 14MM MINI SHLDR CO CHROM COMPHSVE REV PRI CEM: Type: IMPLANTABLE DEVICE | Site: SHOULDER | Status: FUNCTIONAL

## 2021-03-24 DEVICE — IMPLANTABLE DEVICE: Type: IMPLANTABLE DEVICE | Site: SHOULDER | Status: FUNCTIONAL

## 2021-03-24 DEVICE — COMP REV AUG MI BSPLT W T: Type: IMPLANTABLE DEVICE | Site: SHOULDER | Status: FUNCTIONAL

## 2021-03-24 DEVICE — SCREW BNE L30MM DIA4.75MM HD DIA3.5MM CORT FIX ANG: Type: IMPLANTABLE DEVICE | Site: SHOULDER | Status: FUNCTIONAL

## 2021-03-24 DEVICE — SPHERE GLEN DIA36MM REG STD CO CHROM TAPR FIT FOR COMPHSVE: Type: IMPLANTABLE DEVICE | Site: SHOULDER | Status: FUNCTIONAL

## 2021-03-24 DEVICE — SCREW BNE L15MM DIA4.75MM HD DIA3.5MM CORT FIX ANG: Type: IMPLANTABLE DEVICE | Site: SHOULDER | Status: FUNCTIONAL

## 2021-03-24 DEVICE — SCREW BNE L30MM DIA6.5MM HEX HD DIA3.5MM FOR COMPHSVE CONV: Type: IMPLANTABLE DEVICE | Site: SHOULDER | Status: FUNCTIONAL

## 2021-03-24 DEVICE — IMPLANT HMRL TY +3 STD: Type: IMPLANTABLE DEVICE | Site: SHOULDER | Status: FUNCTIONAL

## 2021-03-24 DEVICE — SCREW BNE L20MM DIA4.75MM HD DIA3.5MM CORT FIX ANG: Type: IMPLANTABLE DEVICE | Site: SHOULDER | Status: FUNCTIONAL

## 2021-03-24 DEVICE — ANCHOR SUT NO2 DIA2.9MM MAXBRAID DBL LD SFT W/ TAPR NDL: Type: IMPLANTABLE DEVICE | Site: SHOULDER | Status: FUNCTIONAL

## 2021-03-24 DEVICE — BEARING HUM STD 36 MM SHLDR VIVACIT-E PROLONG COMPHSVE: Type: IMPLANTABLE DEVICE | Site: SHOULDER | Status: FUNCTIONAL

## 2021-03-24 RX ORDER — FENTANYL CITRATE 50 UG/ML
100 INJECTION, SOLUTION INTRAMUSCULAR; INTRAVENOUS ONCE
Status: DISCONTINUED | OUTPATIENT
Start: 2021-03-24 | End: 2021-03-24 | Stop reason: HOSPADM

## 2021-03-24 RX ORDER — ONDANSETRON 2 MG/ML
INJECTION INTRAMUSCULAR; INTRAVENOUS PRN
Status: DISCONTINUED | OUTPATIENT
Start: 2021-03-24 | End: 2021-03-24 | Stop reason: SDUPTHER

## 2021-03-24 RX ORDER — LIDOCAINE HYDROCHLORIDE 10 MG/ML
10 INJECTION, SOLUTION INFILTRATION; PERINEURAL
Status: COMPLETED | OUTPATIENT
Start: 2021-03-24 | End: 2021-03-24

## 2021-03-24 RX ORDER — DEXAMETHASONE SODIUM PHOSPHATE 10 MG/ML
4 INJECTION, SOLUTION INTRAMUSCULAR; INTRAVENOUS ONCE
Status: COMPLETED | OUTPATIENT
Start: 2021-03-24 | End: 2021-03-24

## 2021-03-24 RX ORDER — SODIUM CHLORIDE 0.9 % (FLUSH) 0.9 %
10 SYRINGE (ML) INJECTION PRN
Status: DISCONTINUED | OUTPATIENT
Start: 2021-03-24 | End: 2021-03-24 | Stop reason: HOSPADM

## 2021-03-24 RX ORDER — EPHEDRINE SULFATE/0.9% NACL/PF 50 MG/5 ML
SYRINGE (ML) INTRAVENOUS PRN
Status: DISCONTINUED | OUTPATIENT
Start: 2021-03-24 | End: 2021-03-24 | Stop reason: SDUPTHER

## 2021-03-24 RX ORDER — FENTANYL CITRATE 50 UG/ML
INJECTION, SOLUTION INTRAMUSCULAR; INTRAVENOUS PRN
Status: DISCONTINUED | OUTPATIENT
Start: 2021-03-24 | End: 2021-03-24 | Stop reason: SDUPTHER

## 2021-03-24 RX ORDER — NEOSTIGMINE METHYLSULFATE 1 MG/ML
INJECTION, SOLUTION INTRAVENOUS PRN
Status: DISCONTINUED | OUTPATIENT
Start: 2021-03-24 | End: 2021-03-24 | Stop reason: SDUPTHER

## 2021-03-24 RX ORDER — SODIUM CHLORIDE 9 MG/ML
INJECTION, SOLUTION INTRAVENOUS CONTINUOUS PRN
Status: DISCONTINUED | OUTPATIENT
Start: 2021-03-24 | End: 2021-03-24 | Stop reason: SDUPTHER

## 2021-03-24 RX ORDER — VANCOMYCIN HYDROCHLORIDE 1 G/20ML
INJECTION, POWDER, LYOPHILIZED, FOR SOLUTION INTRAVENOUS PRN
Status: DISCONTINUED | OUTPATIENT
Start: 2021-03-24 | End: 2021-03-24 | Stop reason: ALTCHOICE

## 2021-03-24 RX ORDER — BUPIVACAINE HYDROCHLORIDE AND EPINEPHRINE 5; 5 MG/ML; UG/ML
INJECTION, SOLUTION EPIDURAL; INTRACAUDAL; PERINEURAL PRN
Status: DISCONTINUED | OUTPATIENT
Start: 2021-03-24 | End: 2021-03-24 | Stop reason: ALTCHOICE

## 2021-03-24 RX ORDER — MIDAZOLAM HYDROCHLORIDE 2 MG/2ML
1 INJECTION, SOLUTION INTRAMUSCULAR; INTRAVENOUS EVERY 5 MIN PRN
Status: DISCONTINUED | OUTPATIENT
Start: 2021-03-24 | End: 2021-03-24 | Stop reason: HOSPADM

## 2021-03-24 RX ORDER — GLYCOPYRROLATE 1 MG/5 ML
SYRINGE (ML) INTRAVENOUS PRN
Status: DISCONTINUED | OUTPATIENT
Start: 2021-03-24 | End: 2021-03-24 | Stop reason: SDUPTHER

## 2021-03-24 RX ORDER — SODIUM CHLORIDE 0.9 % (FLUSH) 0.9 %
10 SYRINGE (ML) INJECTION EVERY 12 HOURS SCHEDULED
Status: DISCONTINUED | OUTPATIENT
Start: 2021-03-24 | End: 2021-03-24 | Stop reason: HOSPADM

## 2021-03-24 RX ORDER — ROPIVACAINE HYDROCHLORIDE 5 MG/ML
30 INJECTION, SOLUTION EPIDURAL; INFILTRATION; PERINEURAL
Status: COMPLETED | OUTPATIENT
Start: 2021-03-24 | End: 2021-03-24

## 2021-03-24 RX ORDER — DIPHENHYDRAMINE HYDROCHLORIDE 50 MG/ML
12.5 INJECTION INTRAMUSCULAR; INTRAVENOUS
Status: DISCONTINUED | OUTPATIENT
Start: 2021-03-24 | End: 2021-03-24 | Stop reason: HOSPADM

## 2021-03-24 RX ORDER — CEPHALEXIN 500 MG/1
500 CAPSULE ORAL 4 TIMES DAILY
Qty: 40 CAPSULE | Refills: 0 | Status: SHIPPED | OUTPATIENT
Start: 2021-03-24 | End: 2021-03-25 | Stop reason: SDUPTHER

## 2021-03-24 RX ORDER — OXYCODONE HYDROCHLORIDE AND ACETAMINOPHEN 5; 325 MG/1; MG/1
1 TABLET ORAL EVERY 6 HOURS PRN
Qty: 28 TABLET | Refills: 0 | Status: SHIPPED | OUTPATIENT
Start: 2021-03-24 | End: 2021-04-01 | Stop reason: SDUPTHER

## 2021-03-24 RX ORDER — SODIUM CHLORIDE 9 MG/ML
INJECTION, SOLUTION INTRAVENOUS CONTINUOUS
Status: DISCONTINUED | OUTPATIENT
Start: 2021-03-24 | End: 2021-03-24 | Stop reason: HOSPADM

## 2021-03-24 RX ORDER — ROCURONIUM BROMIDE 10 MG/ML
INJECTION, SOLUTION INTRAVENOUS PRN
Status: DISCONTINUED | OUTPATIENT
Start: 2021-03-24 | End: 2021-03-24 | Stop reason: SDUPTHER

## 2021-03-24 RX ORDER — PROPOFOL 10 MG/ML
INJECTION, EMULSION INTRAVENOUS PRN
Status: DISCONTINUED | OUTPATIENT
Start: 2021-03-24 | End: 2021-03-24 | Stop reason: SDUPTHER

## 2021-03-24 RX ORDER — MEPERIDINE HYDROCHLORIDE 25 MG/ML
12.5 INJECTION INTRAMUSCULAR; INTRAVENOUS; SUBCUTANEOUS EVERY 5 MIN PRN
Status: DISCONTINUED | OUTPATIENT
Start: 2021-03-24 | End: 2021-03-24 | Stop reason: HOSPADM

## 2021-03-24 RX ORDER — DEXAMETHASONE SODIUM PHOSPHATE 4 MG/ML
INJECTION, SOLUTION INTRA-ARTICULAR; INTRALESIONAL; INTRAMUSCULAR; INTRAVENOUS; SOFT TISSUE PRN
Status: DISCONTINUED | OUTPATIENT
Start: 2021-03-24 | End: 2021-03-24 | Stop reason: SDUPTHER

## 2021-03-24 RX ADMIN — ROPIVACAINE HYDROCHLORIDE 30 ML: 5 INJECTION, SOLUTION EPIDURAL; INFILTRATION; PERINEURAL at 07:15

## 2021-03-24 RX ADMIN — DEXAMETHASONE SODIUM PHOSPHATE 10 MG: 4 INJECTION, SOLUTION INTRAMUSCULAR; INTRAVENOUS at 08:06

## 2021-03-24 RX ADMIN — PHENYLEPHRINE HYDROCHLORIDE 50 MCG: 10 INJECTION INTRAVENOUS at 08:37

## 2021-03-24 RX ADMIN — SODIUM CHLORIDE: 9 INJECTION, SOLUTION INTRAVENOUS at 08:15

## 2021-03-24 RX ADMIN — LIDOCAINE HYDROCHLORIDE 10 ML: 10 INJECTION, SOLUTION INFILTRATION; PERINEURAL at 07:10

## 2021-03-24 RX ADMIN — FENTANYL CITRATE 25 MCG: 50 INJECTION, SOLUTION INTRAMUSCULAR; INTRAVENOUS at 10:01

## 2021-03-24 RX ADMIN — PHENYLEPHRINE HYDROCHLORIDE 50 MCG: 10 INJECTION INTRAVENOUS at 08:05

## 2021-03-24 RX ADMIN — DEXAMETHASONE SODIUM PHOSPHATE 4 MG: 10 INJECTION, SOLUTION INTRAMUSCULAR; INTRAVENOUS at 07:14

## 2021-03-24 RX ADMIN — Medication 10 MG: at 07:53

## 2021-03-24 RX ADMIN — MIDAZOLAM HYDROCHLORIDE 2 MG: 1 INJECTION, SOLUTION INTRAMUSCULAR; INTRAVENOUS at 07:05

## 2021-03-24 RX ADMIN — Medication 0.2 MG: at 09:28

## 2021-03-24 RX ADMIN — ROCURONIUM BROMIDE 50 MG: 10 INJECTION, SOLUTION INTRAVENOUS at 07:35

## 2021-03-24 RX ADMIN — Medication 2 G: at 07:46

## 2021-03-24 RX ADMIN — Medication 10 MG: at 08:05

## 2021-03-24 RX ADMIN — Medication 10 MG: at 08:43

## 2021-03-24 RX ADMIN — PHENYLEPHRINE HYDROCHLORIDE 50 MCG: 10 INJECTION INTRAVENOUS at 09:01

## 2021-03-24 RX ADMIN — PROPOFOL 170 MG: 10 INJECTION, EMULSION INTRAVENOUS at 07:35

## 2021-03-24 RX ADMIN — FENTANYL CITRATE 25 MCG: 50 INJECTION, SOLUTION INTRAMUSCULAR; INTRAVENOUS at 09:45

## 2021-03-24 RX ADMIN — PHENYLEPHRINE HYDROCHLORIDE 50 MCG: 10 INJECTION INTRAVENOUS at 08:33

## 2021-03-24 RX ADMIN — PHENYLEPHRINE HYDROCHLORIDE 50 MCG: 10 INJECTION INTRAVENOUS at 08:20

## 2021-03-24 RX ADMIN — FENTANYL CITRATE 50 MCG: 50 INJECTION, SOLUTION INTRAMUSCULAR; INTRAVENOUS at 07:35

## 2021-03-24 RX ADMIN — Medication 1 MG: at 09:28

## 2021-03-24 RX ADMIN — SODIUM CHLORIDE: 9 INJECTION, SOLUTION INTRAVENOUS at 07:31

## 2021-03-24 RX ADMIN — ONDANSETRON 4 MG: 2 INJECTION INTRAMUSCULAR; INTRAVENOUS at 09:15

## 2021-03-24 RX ADMIN — ROPIVACAINE HYDROCHLORIDE 30 ML: 5 INJECTION, SOLUTION EPIDURAL; INFILTRATION; PERINEURAL at 07:14

## 2021-03-24 RX ADMIN — PHENYLEPHRINE HYDROCHLORIDE 50 MCG: 10 INJECTION INTRAVENOUS at 09:17

## 2021-03-24 ASSESSMENT — PULMONARY FUNCTION TESTS
PIF_VALUE: 22
PIF_VALUE: 0
PIF_VALUE: 19
PIF_VALUE: 25
PIF_VALUE: 26
PIF_VALUE: 26
PIF_VALUE: 25
PIF_VALUE: 23
PIF_VALUE: 25
PIF_VALUE: 26
PIF_VALUE: 26
PIF_VALUE: 2
PIF_VALUE: 1
PIF_VALUE: 26
PIF_VALUE: 18
PIF_VALUE: 26
PIF_VALUE: 9
PIF_VALUE: 25
PIF_VALUE: 24
PIF_VALUE: 25
PIF_VALUE: 26
PIF_VALUE: 26
PIF_VALUE: 3
PIF_VALUE: 9
PIF_VALUE: 16
PIF_VALUE: 18
PIF_VALUE: 26
PIF_VALUE: 15
PIF_VALUE: 25
PIF_VALUE: 24
PIF_VALUE: 25
PIF_VALUE: 26
PIF_VALUE: 24
PIF_VALUE: 25
PIF_VALUE: 18
PIF_VALUE: 26
PIF_VALUE: 19
PIF_VALUE: 26
PIF_VALUE: 6
PIF_VALUE: 26
PIF_VALUE: 2
PIF_VALUE: 18
PIF_VALUE: 25
PIF_VALUE: 26
PIF_VALUE: 23
PIF_VALUE: 25
PIF_VALUE: 20
PIF_VALUE: 2
PIF_VALUE: 26
PIF_VALUE: 25
PIF_VALUE: 14
PIF_VALUE: 2
PIF_VALUE: 26
PIF_VALUE: 2
PIF_VALUE: 25
PIF_VALUE: 26
PIF_VALUE: 23
PIF_VALUE: 2
PIF_VALUE: 23
PIF_VALUE: 25
PIF_VALUE: 26
PIF_VALUE: 2
PIF_VALUE: 23
PIF_VALUE: 26
PIF_VALUE: 25
PIF_VALUE: 23
PIF_VALUE: 26
PIF_VALUE: 0
PIF_VALUE: 25
PIF_VALUE: 26
PIF_VALUE: 22
PIF_VALUE: 25
PIF_VALUE: 25
PIF_VALUE: 2
PIF_VALUE: 26
PIF_VALUE: 23
PIF_VALUE: 25
PIF_VALUE: 25
PIF_VALUE: 2
PIF_VALUE: 22
PIF_VALUE: 26

## 2021-03-24 ASSESSMENT — PAIN SCALES - GENERAL
PAINLEVEL_OUTOF10: 0
PAINLEVEL_OUTOF10: 5
PAINLEVEL_OUTOF10: 0

## 2021-03-24 ASSESSMENT — PAIN DESCRIPTION - DESCRIPTORS: DESCRIPTORS: DISCOMFORT

## 2021-03-24 NOTE — PROGRESS NOTES
Pt in stage 2 recovery c/o discomfort in R eye. States \"it feel like there's something in there. Eye anesthetized with tetracaine drops x2. Fluorescein dye instilled. UV light examination shows no sign of corneal abrasion. Pt reassured and expresses eye feels better after drops.

## 2021-03-24 NOTE — OP NOTE
Operative Note      Patient: Jazzmine Morris. YOB: 1945  MRN: [id number]    Date of Procedure: 3/24/2021    Pre-Op Diagnosis: RIGHT SHOULDER ARTHRITIS    Post-Op Diagnosis: Same       Procedure(s):  RIGHT REVERSE TOTAL SHOULDER ARTHROPLASTY WITH ISB    Surgeon(s):  Maria Antonia Brunner MD    Assistant:   Resident: Sharmin Nolan DO    Anesthesia: General    Estimated Blood Loss (mL): less than 50     Complications: None    Specimens:   ID Type Source Tests Collected by Time Destination   A : BONE RIGHT SHOULDER Bone Bone SURGICAL PATHOLOGY Maria Antonia Brunner MD 3/24/2021 3674        Implants:  Implant Name Type Inv. Item Serial No.  Lot No. LRB No. Used Action   COMPONENT TOT SHLDR RT GUIDE BNE MODEL COMPHSVE  COMPONENT TOT SHLDR RT GUIDE BNE MODEL [facility]- [id number] Right 1 Implanted   IMPL SHLD AUG COMPR GD AND BN R Shoulder/Arm/Wrist/Hand IMPL SHLD AUG COMPR GD AND BN R  1957 [facility] [id number] Right 1 Implanted   COMP REV AUG MI BSPLT W T  COMP REV AUG MI [employer]-WD [id number] Right 1 Implanted   SCREW BNE L30MM DIA6. 5MM HEX HD DIA3. [address] [facility] CONV  SCREW BNE L30MM DIA6. 5MM HEX HD DIA3. 5MM FOR COMPHSVE CONV  [employer]- [id number] Right 1 Implanted   SCREW BNE L25MM DIA4. 75MM HD DIA3.5MM MINH FIX ANG  SCREW BNE L25MM DIA4. 75MM HD DIA3.5MM MINH FIX ANG  [employer]- [id number] Right 1 Implanted   SCREW BNE L20MM DIA4. 75MM HD DIA3.5MM MINH FIX ANG  SCREW BNE L20MM DIA4. 75MM HD DIA3.5MM MINH FIX ANG  [employer]- [id number] Right 1 Implanted   SCREW BNE L30MM DIA4. 75MM HD DIA3.5MM MINH FIX ANG  SCREW BNE L30MM DIA4. 75MM HD DIA3.5MM MINH FIX ANG  [employer]- [id number] Right 1 Implanted   SCREW BNE L15MM DIA4. 75MM HD DIA3.5MM MINH FIX ANG  SCREW BNE L15MM DIA4. 75MM HD DIA3.5MM MINH FIX ANG  [employer]-WD [id number] Right 1 Implanted   SPHERE CYNDI [id number] REG STD CO CHROM TAPR FIT FOR COMPHSVE  SPHERE CYNDI [id number] REG STD CO CHROM TAPR FIT FOR COMPHSVE  [employer]- [id number] Right 1 Implanted   STEM HUM 14MM MINI SHLDR CO CHROM COMPHSVE REV MUNDO ALIYAH  STEM HUM 14MM MINI SHLDR CO CHROM COMPHSVE REV MNUDO ALIYAH  [employer]- [id number] Right 1 Implanted   ANCHOR SUT NO2 DIA2. 9MM MAXBRAID DBL LD SFT W/ TAPR NDL  ANCHOR SUT NO2 DIA2. 9MM MAXBRAID DBL LD SFT W/ TAPR NDL  [employer]- [id number] Right 1 Implanted   BEARING HUM STD 36 MM SHLDR VIVACIT-E PROLONG COMPHSVE  BEARING HUM STD 36 MM SHLDR VIVACIT-E PROLONG COMPHSVE  [employer] ETEX MOISES-WD [id number] Right 1 Implanted   IMPLANT HMRL TY +3 STD  IMPLANT HMRL TY +3 STD  [employer] ETEX MOISES-WD [id number] Right 1 Implanted         Drains: * No LDAs found *    Findings: see details    Detailed Description of Procedure:   PROVIDER:     Nataliia Carlson MD     DATE OF PROCEDURE:   3/24/2021     PREOPERATIVE DIAGNOSIS:  right shoulder arthritis.     POSTOPERATIVE DIAGNOSIS:  right shoulder arthritis.     PROCEDURE PERFORMED:   1. right reverse total shoulder arthroplasty using   Biomet system with a medium posterior augment base plate  with a size 36 glenosphere with inferior offset and a +3 lateralized tray with standard poly reverse shoulder with a 14 mini humeral press-fit stem. 2. Biceps tenodesis  3. Repair subscapularis and anterior rotator cuff with interval closure  4. Application 1g Vancomycin powder     ANESTHESIA:  1. General.  2. Brachial Plexus block  3. Local anesthetic by surgeon consisting of approximately 20 mL of  0.25% Marcaine with epinephrine.     ASSISTANTS:   Dr. Claudene Para, orthopedic surgery resident.     FINDINGS:  1. Severe arthrosis and synovitis involving the right shoulder.   2.  Status post reverse total shoulder arthroplasty there was excellent stability to the shoulder throughout a full range of motion with full forward flexion, abduction,  and external rotation and internal rotation without any anterior  shoulder instability.     COMPLICATIONS:  None.     SPECIMENS:  Humeral head was sent for pathology.     DISPOSITION:  The patient remained stable throughout the procedure.     OPERATIVE INDICATIONS:  The patient  presents with persistent shoulder pain and wished to proceed with total shoulder arthroplasty. The risks, benefits, alternatives,  and complications were fully outlined and explained to him including,  but not limited to the risk of infection; damage to nerves, vessels, or  tendons; failure to improve his symptoms or worsening symptoms;  persistent symptoms; shoulder dislocations; periprosthetic fractures  and/or dislocations were explained as well as unforeseen complications. The patient understood and wished to proceed.     SURGERY IN DETAIL:  The patient was identified in the holding area. The   shoulder was identified as the surgical site. The patient was then seen by  Anesthesia, taken to the operating room, placed supine on the table, and  underwent general anesthesia per Anesthesia Department. The patient was placed in a well-padded beach chair position. All bony prominences were  well padded. The shoulder was then prepped and draped in a  standard sterile fashion.     A well-padded arm positioner for the spider positioner was then placed  and the arm was placed into the spider positioner.     Surgical pause was undertaken confirming the site and procedure. Anterior deltopectoral approach was then undertaken. Cephalic vein was  identified and reflected medially with a portion of the deltoid. The  deltoid then bluntly dissected off the lateral aspect of the shoulder  and retracted with a shoulder retractor. The biceps tendon was  identified with a large amount of synovitis along the entire long head  of the biceps and the interval was developed into the shoulder joint. A  large seroma was encountered and evacuated.     A biceps tenotomy was completed. The  subscapularis was then released off the anterior aspect of the lesser  tuberosity with progressive external rotation to the shoulder followed  by dissection along the humeral neck. The shoulder was then anteriorly  dislocated and a whipstitch placed in the subscapularis for later  retrieval and repair. There was a tear of the anterior supraspinatus. The shoulder was then sequentially reamed up to  the size 14. At 30 degrees of retroversion, a guide system was placed  and a cut was made within the humeral head. The instrumentation was  then removed and attention was directed towards the glenoid. The  anterior labrum was then released as well as the superior aspect of the  insertion of the biceps sharply. Dissection was carried bluntly down  the inferior neck of the glenoid. The posterior soft tissues were released and a retractor was placed posterior in the glenoid. Given the severity of the arthritis and significant posterior glenoid wear as well as the tear of the leading edge of the supraspinatus a reverse total shoulder arthroplasty was elected.     A Signature guide was then placed onto the glenoid. The central and superior reaming depth pins were placed. These were checked with the bone model and were deemed appropriate. This was then drilled down to the appropriate depth. With the pin in place, the reamer was used to  ream the inferior aspect of the glenoid based on preoperative  templating. The glenoid was then reamed. Based on preoperative templating a posterior augment in approximately the 10 o'clock position was then elected. This was checked using the sizing guide and was found to be appropriate for the medium size. Therefore the augment position was selected and drilled. The reaming guide was then inserted and screwed into place. The augment reamer was then applied and reamed. The guide was then removed the wound was pulsatile lavaged out.   The trial was then inserted and found to have appropriate contact. The medium posterior augment baseplate implant was then selected and positioned with  excellent contact onto the bony surfaces. A central screw was then  placed and had excellent compression. Superior and inferior locking screws were  inserted as well as anterior and posterior locking screws. The central screw was rechecked to ensure complete fit. The pulsatile lavage was then used to copiously irrigate out the wound  and the 36-mm inferior offset glenosphere was selected and placed  inferiorly and impacted with a good Ariza taper fit achieved.     The shoulder was then redislocated anteriorly. Broaching was then  performed up to a 14 mini humeral stem. This was then trialed and found  to have good fit with the dictated components. The shoulder was  redislocated. The stem was removed. A JuggerKnot anchor was then placed into the lesser tuberosity and placed around the stem of the implant. The final implant was then impacted. The humeral components with the +3 lateralized baseplate and neutral poly again were trialed with good stability achieved. This final implant was then assembled on the back table and impacted onto the shoulder. The shoulder was brought through a range of motion with full forward flexion, abduction,  and shoulder cross-arm adduction and good stability with internal and  external rotation. With this achieved, the wound was copiously  irrigated out. the subscapularis repaired back down through the  [place] [id number]. The anterior supraspinatus rotator cuff tear was also repaired with a juggernaut sutures and the interval closed. The subscapularis was stable with external rotation to about 30 degrees. The biceps tendon was then tenodesed over the repaired subscapularis. Prior to closure, 500 mg of  vancomycin powder was placed deep within the shoulder followed by  application of another 203 mg in the soft tissues.   Prior to final  application of the vancomycin powder, copious irrigation with pulsatile  lavage was performed followed by repair of the deltopectoral interval  with 0 Vicryl suture and repair of the skin with 2-0 Vicryl, 3-0  Monocryl, and an Aquacel dressing. A shoulder immobilizer was placed.    The patient tolerated the procedure well and was taken to recovery room.           Darien Pugh MD      Electronically signed by Krystina Daly MD on 3/24/2021 at 9:30 AM

## 2021-03-24 NOTE — BRIEF OP NOTE
Brief Postoperative Note      Patient: Kami Tillman YOB: 1945  MRN: [id number]    Date of Procedure: 3/24/2021    Pre-Op Diagnosis: RIGHT SHOULDER ARTHRITIS    Post-Op Diagnosis: Same       Procedure(s):  RIGHT REVERSE TOTAL SHOULDER ARTHROPLASTY WITH ISB    Surgeon(s):  Lizandro Hernandez MD    Assistant:  Resident: Brittni Campos DO    Anesthesia: General    Estimated Blood Loss (mL): less than 50     Complications: None    Specimens:   ID Type Source Tests Collected by Time Destination   A : BONE RIGHT SHOULDER Bone Bone SURGICAL PATHOLOGY Lizandro Hernandez MD 3/24/2021 3410        Implants:  Implant Name Type Inv. Item Serial No.  Lot No. LRB No. Used Action   COMPONENT TOT SHLDR RT GUIDE BNE MODEL COMPHSVE  COMPONENT TOT SHLDR RT GUIDE BNE MODEL [employer]- [id number] Right 1 Implanted   IMPL SHLD AUG COMPR GD AND BN R Shoulder/Arm/Wrist/Hand IMPL SHLD AUG COMPR GD AND BN R  [employer] [id number] Right 1 Implanted   COMP REV AUG MI BSPLT W T  COMP REV AUG MI [employer]-WD [id number] Right 1 Implanted   SCREW BNE L30MM DIA6. 5MM HEX HD DIA3. [facility] CONV  SCREW BNE L30MM DIA6. 5MM HEX HD DIA3. 5MM FOR COMPHSVE CONV  [employer]- [id number] Right 1 Implanted   SCREW BNE L25MM DIA4. 75MM HD DIA3.5MM MINH FIX ANG  SCREW BNE L25MM DIA4. 75MM HD DIA3.5MM MINH FIX ANG  [employer]- [id number] Right 1 Implanted   SCREW BNE L20MM DIA4. 75MM HD DIA3.5MM MINH FIX ANG  SCREW BNE L20MM DIA4. 75MM HD DIA3.5MM MINH FIX ANG  [employer]- [id number] Right 1 Implanted   SCREW BNE L30MM DIA4. 75MM HD DIA3.5MM MINH FIX ANG  SCREW BNE L30MM DIA4. 75MM HD DIA3.5MM MINH FIX ANG  [employer]- [id number] Right 1 Implanted   SCREW BNE L15MM DIA4. 75MM HD DIA3.5MM MINH FIX ANG  SCREW BNE L15MM DIA4. 75MM HD DIA3.5MM MINH FIX ANG  [employer]-WD [id number] Right 1 Implanted   SPHERE CYNDI [id number] REG STD CO CHROM TAPR FIT FOR COMPHSVE  SPHERE CYNDI [id number] REG STD CO CHROM TAPR FIT FOR COMPHSVE  [employer]-WD [id number] Right 1 Implanted   STEM HUM 14MM MINI SHLDR CO CHROM COMPHSVE REV MUNDO ALIYAH  STEM HUM 14MM MINI SHLDR CO CHROM COMPHSVE REV MUNDO ALIYAH  [employer]-WD [id number] Right 1 Implanted   ANCHOR SUT NO2 DIA2. 9MM MAXBRAID DBL LD SFT W/ TAPR NDL  ANCHOR SUT NO2 DIA2. 9MM MAXBRAID DBL LD SFT W/ TAPR NDL  [employer]-WD [id number] Right 1 Implanted   BEARING HUM STD 36 MM SHLDR VIVACIT-E PROLONG COMPHSVE  BEARING HUM STD 36 MM SHLDR VIVACIT-E PROLONG COMPHSVE  [employer] ET[employer]IS[employer]-WD [id number] Right 1 Implanted   IMPLANT HMRL TY +3 STD  IMPLANT HMRL TY +3 STD  [employer]IS[employer]-WD [id number] Right 1 Implanted         Drains: * No LDAs found *    Findings: see op note    Electronically signed by Elda Velásquez DO on 3/24/2021 at 10:06 AM

## 2021-03-24 NOTE — H&P
Department of Orthopedic Surgery  [facility] Arlin Gunter MD  History and Physical        CHIEF COMPLAINT: Right shoulder pain     HISTORY OF PRESENT ILLNESS:                 The patient is a 70 y.o. male who presents with persistent recalcitrant right shoulder pain. Patient reports a several year history of worsening right shoulder pain. He is now reporting loss of range of motion in addition to his pain. Has had multiple cortisone injections into his right shoulder without any significant pain improvement. Pain is worsened with activities particular overhead lifting. He is avoiding certain activities such as gun shooting which he enjoys secondary the pain in the shoulder. 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 Reports no heavy repetitive use of the shoulder. He has not had any other treatments. He does have a history of a right previous rotator cuff repair which she reports persistent pain afterwards. .     Past Medical History:    Past Medical History    History reviewed. No pertinent past medical history. Past Surgical History:    Past Surgical History             Procedure Laterality Date    CARPAL TUNNEL RELEASE Bilateral      KNEE SURGERY Bilateral      ROTATOR CUFF REPAIR Right           Current Medications:   Current Hospital Medications   No current facility-administered medications for this visit. Allergies:  Patient has no known allergies.     Social History:   TOBACCO:   reports that he has quit smoking. He has never used smokeless tobacco.  ETOH:   reports current alcohol use. DRUGS:   reports no history of drug use. ACTIVITIES OF DAILY LIVING:    OCCUPATION:    Family History:   Family History   History reviewed.  No pertinent family history.        REVIEW OF SYSTEMS:  CONSTITUTIONAL:  negative  EYES:  negative  HEENT:  negative  RESPIRATORY:  negative  CARDIOVASCULAR:  negative  GASTROINTESTINAL:  negative  GENITOURINARY:  negative  INTEGUMENT/BREAST:  negative  HEMATOLOGIC/LYMPHATIC: 03/20/2018     MPV 9.8 03/20/2018      PT/INR:  No results found for: PROTIME, INR     Radiology Review: X-rays of the right shoulder AP and axillary views demonstrate advanced primary glenohumeral joint arthritis     IMPRESSION:  · End-stage right shoulder glenohumeral joint arthritis  · History of previous rotator cuff repair right shoulder        PLAN:  Today's findings were explained the patient. I did use bone models to demonstrate total shoulder arthroplasty. Explained surgical intervention in detail. After discussion he would like to proceed with surgical invention. Plan for right total shoulder arthroplasty with convertible glenoid. Discussed possible need for reverse total shoulder arthroplasty depending on the nature of his rotator cuff at the time of his surgery. Postoperative course and expectations were outlined. All questions answered        I explained the risks, benefits, alternatives and complications of surgery with the patient including but not limited to the risks of infection, possible damage to nerves, vessels, or tendons, stiffness, loss of range of motion, scar sensitivity, wound healing complications, worsening symptoms, periprosthetic fractures and/or dislocations, possible need for therapy, as well as the possible need further surgery and unanticipated complications. The patient voiced understanding and all questions were answered. The patient elected to proceed with surgical intervention. The patient was counseled at length about the risks of gennaro Covid-19 during their perioperative period and any recovery window from their procedure. The patient was made aware that gennaro Covid-19  may worsen their prognosis for recovering from their procedure  and lend to a higher morbidity and/or mortality risk. All material risks, benefits, and reasonable alternatives including postponing the procedure were discussed.  The patient does wish to proceed with the procedure at this time.    History and Physical Update     Patient was seen and examined. Patient's history and physical was reviewed with the patient. There has been no significant interval changes.

## 2021-03-24 NOTE — ANESTHESIA POSTPROCEDURE EVALUATION
Department of Anesthesiology  Postprocedure Note    Patient: Pascual Viera MRN: [id number]  YOB: 1945  Date of evaluation: 3/24/2021  Time:  11:41 AM     Procedure Summary     Date: 03/24/21 Room / Location: [facility] OR 05 / [facility]    Anesthesia Start: 0317 Anesthesia Stop: 1679    Procedure: RIGHT REVERSE TOTAL SHOULDER ARTHROPLASTY WITH ISB (Right Shoulder) Diagnosis: (RIGHT SHOULDER ARTHRITIS)    Surgeons: Wilver Do MD Responsible Provider: Rachelle Lagos MD    Anesthesia Type: general, regional ASA Status: 2          Anesthesia Type: general, regional    Blossom Phase I: Blossom Score: 9    Blossom Phase II: Blossom Score: 10    Last vitals: Reviewed and per EMR flowsheets.        Anesthesia Post Evaluation    Patient location during evaluation: PACU  Patient participation: complete - patient participated  Level of consciousness: awake and alert  Airway patency: patent  Nausea & Vomiting: no nausea and no vomiting  Complications: no  Cardiovascular status: hemodynamically stable  Respiratory status: acceptable  Hydration status: euvolemic

## 2021-03-25 RX ORDER — ROPIVACAINE HYDROCHLORIDE 5 MG/ML
INJECTION, SOLUTION EPIDURAL; INFILTRATION; PERINEURAL
Status: DISCONTINUED | OUTPATIENT
Start: 2021-03-24 | End: 2021-03-25 | Stop reason: SDUPTHER

## 2021-03-25 RX ORDER — CEPHALEXIN 500 MG/1
500 CAPSULE ORAL 4 TIMES DAILY
Qty: 40 CAPSULE | Refills: 0 | Status: SHIPPED | OUTPATIENT
Start: 2021-03-25 | End: 2021-04-04

## 2021-03-25 NOTE — ANESTHESIA PROCEDURE NOTES
Peripheral Block    Patient location during procedure: pre-op  Start time: 3/24/2021 7:10 AM  End time: 3/24/2021 7:15 AM  Staffing  Performed: anesthesiologist   Anesthesiologist: Baljeet Sánchez MD  Preanesthetic Checklist  Completed: patient identified, IV checked, site marked, risks and benefits discussed, surgical consent, monitors and equipment checked, pre-op evaluation, timeout performed, anesthesia consent given, oxygen available and patient being monitored  Peripheral Block  Patient position: sitting  Prep: ChloraPrep  Patient monitoring: cardiac monitor, continuous pulse ox, frequent blood pressure checks and IV access  Block type: Brachial plexus  Laterality: right  Injection technique: single-shot  Guidance: ultrasound guided  Local infiltration: lidocaine  Infiltration strength: 1 %  Dose: 1 mL  Interscalene  Provider prep: mask and sterile gloves  Local infiltration: lidocaine  Needle  Needle type: short-bevel   Needle gauge: 22 G  Needle length: 5 cm  Needle localization: ultrasound guidance  Assessment  Injection assessment: negative aspiration for heme, no paresthesia on injection and local visualized surrounding nerve on ultrasound  Slow fractionated injection: yes  Hemodynamics: stable  Medications Administered  Ropivacaine (NAROPIN) injection 0.5%, 30 mL  Reason for block: post-op pain management and at surgeon's request

## 2021-03-31 ENCOUNTER — TELEPHONE (OUTPATIENT)
Dept: ORTHOPEDIC SURGERY | Age: 76
End: 2021-03-31

## 2021-03-31 DIAGNOSIS — M19.011 PRIMARY OSTEOARTHRITIS OF RIGHT SHOULDER: Primary | ICD-10-CM

## 2021-04-01 ENCOUNTER — OFFICE VISIT (OUTPATIENT)
Dept: ORTHOPEDIC SURGERY | Age: 76
End: 2021-04-01

## 2021-04-01 VITALS — HEIGHT: 69 IN | WEIGHT: 180 LBS | BODY MASS INDEX: 26.66 KG/M2 | RESPIRATION RATE: 22 BRPM

## 2021-04-01 DIAGNOSIS — G89.29 CHRONIC RIGHT SHOULDER PAIN: Primary | ICD-10-CM

## 2021-04-01 DIAGNOSIS — G89.18 POST-OP PAIN: ICD-10-CM

## 2021-04-01 DIAGNOSIS — M25.511 CHRONIC RIGHT SHOULDER PAIN: Primary | ICD-10-CM

## 2021-04-01 PROCEDURE — 99024 POSTOP FOLLOW-UP VISIT: CPT | Performed by: ORTHOPAEDIC SURGERY

## 2021-04-01 RX ORDER — OXYCODONE HYDROCHLORIDE AND ACETAMINOPHEN 5; 325 MG/1; MG/1
1 TABLET ORAL EVERY 6 HOURS PRN
Qty: 28 TABLET | Refills: 0 | Status: SHIPPED | OUTPATIENT
Start: 2021-04-01 | End: 2021-04-08

## 2021-04-01 NOTE — PROGRESS NOTES
Postop right reverse total shoulder arthroplasty. He was doing well. Pain is well controlled. Exam: Incision healing nicely. Abduction 45 degrees. External rotation neutral.  Radial, ulnar, median axillary nerve sensation intact light touch. Otherwise neuro vas intact. X-rays in office today: AP lateral views of the right shoulder demonstrate reverse stable reverse total shoulder arthroplasty. Hardware in appropriate alignment. Impression office x-rays: Stable reverse total shoulder arthroplasty        8 days postop reverse total shoulder arthroplasty    Plan    Patient is referred over for therapy. Follow-up 4 weeks with new x-rays      Informed consent was obtained for continued opioid treatment. Patient agrees to continue the current treatment and agrees the benefits of opioid treatment ( decreased pain, improved function) continue to outweigh the potential risks ( confusion, change in thinking ability, depression, dry mouth, nausea, constipation, vomiting, impaired coordination/balance, sleepiness, damage liver or kidneys, opioid-induced hyperalgesia, physical dependence, addiction, withdrawal, respiratory depression and even death).

## 2021-04-28 ENCOUNTER — TELEPHONE (OUTPATIENT)
Dept: ORTHOPEDIC SURGERY | Age: 76
End: 2021-04-28

## 2021-04-28 DIAGNOSIS — M19.011 PRIMARY OSTEOARTHRITIS OF RIGHT SHOULDER: Primary | ICD-10-CM

## 2021-04-29 ENCOUNTER — OFFICE VISIT (OUTPATIENT)
Dept: ORTHOPEDIC SURGERY | Age: 76
End: 2021-04-29

## 2021-04-29 VITALS — HEIGHT: 68 IN | BODY MASS INDEX: 27.28 KG/M2 | WEIGHT: 180 LBS | RESPIRATION RATE: 18 BRPM

## 2021-04-29 DIAGNOSIS — M19.011 PRIMARY OSTEOARTHRITIS OF RIGHT SHOULDER: Primary | ICD-10-CM

## 2021-04-29 PROCEDURE — 99024 POSTOP FOLLOW-UP VISIT: CPT | Performed by: ORTHOPAEDIC SURGERY

## 2021-07-20 ENCOUNTER — OFFICE VISIT (OUTPATIENT)
Dept: ORTHOPEDIC SURGERY | Age: 76
End: 2021-07-20
Payer: MEDICARE

## 2021-07-20 ENCOUNTER — TELEPHONE (OUTPATIENT)
Dept: ORTHOPEDIC SURGERY | Age: 76
End: 2021-07-20

## 2021-07-20 VITALS — WEIGHT: 180 LBS | RESPIRATION RATE: 18 BRPM | BODY MASS INDEX: 26.66 KG/M2 | HEIGHT: 69 IN

## 2021-07-20 DIAGNOSIS — M19.011 PRIMARY OSTEOARTHRITIS OF RIGHT SHOULDER: Primary | ICD-10-CM

## 2021-07-20 DIAGNOSIS — S46.911A STRAIN OF RIGHT SHOULDER, INITIAL ENCOUNTER: Primary | ICD-10-CM

## 2021-07-20 PROCEDURE — 99213 OFFICE O/P EST LOW 20 MIN: CPT | Performed by: ORTHOPAEDIC SURGERY

## 2021-07-20 NOTE — PROGRESS NOTES
Chief Complaint   Patient presents with    Follow Up After Procedure     4 months out. RIGHT REVERSE TOTAL SHOULDER ARTHROPLASTY WITH ISB    Shoulder Pain     increased R shoulder pain. lifted something  and has increased pain since           Fearing is a 70y.o. year old who presents for follow up s/p right reverse TSA. Patient is about 4 months postop. He states he is doing well until Friday. He states on Friday he was helping his friend lift a generator weighing about 150 pounds. He states he did not have immediate pain. He does report a couple hours later he started to have pain to his right shoulder. He reports this is mainly to the anterior aspect of the shoulder. He states this has slightly improved since Friday.       Past Medical History:   Diagnosis Date    Shoulder pain, right     For OR 3-24-21     Past Surgical History:   Procedure Laterality Date    CARPAL TUNNEL RELEASE Bilateral     KNEE SURGERY Bilateral     ROTATOR CUFF REPAIR Right     SHOULDER SURGERY Right 3/24/2021    RIGHT REVERSE TOTAL SHOULDER ARTHROPLASTY WITH ISB performed by Jaja Moser MD at [place] [address]         Current Outpatient Medications:     Multiple Vitamins-Minerals (MULTIVITAMIN ADULT PO), Take 1 capsule by mouth daily, Disp: , Rfl:     ZINC PO, Take 1 capsule by mouth daily, Disp: , Rfl:     Cholecalciferol (VITAMIN D3) 50 MCG (2000) CAPS, Take 1 capsule by mouth daily, Disp: , Rfl:   No Known Allergies  Social History     Socioeconomic History    Marital status:      Spouse name: Not on file    Number of children: Not on file    Years of education: Not on file    Highest education level: Not on file   Occupational History    Not on file   Tobacco Use    Smoking status: Former Smoker     Packs/day: 1.00     Years: 20.00     Pack years: 20.00     Types: Cigarettes     Quit date: 18     Years since quittin.5    Smokeless tobacco: Never Used   Substance and Sexual Activity    Alcohol use: Yes     Comment: rarely    Drug use: No    Sexual activity: Not on file   Other Topics Concern    Not on file   Social History Narrative    Not on file     Social Determinants of Health     Financial Resource Strain:     Difficulty of Paying Living Expenses:    Food Insecurity:     Worried About Running Out of Food in the Last Year:     [address] in the Last Year:    Transportation Needs:     Lack of Transportation (Medical):  Lack of Transportation (Non-Medical):    Physical Activity:     Days of Exercise per Week:     Minutes of Exercise per Session:    Stress:     Feeling of Stress :    Social Connections:     Frequency of Communication with Friends and Family:     Frequency of Social Gatherings with Friends and Family:     Attends Buddhist Services:     Active Member of Clubs or Organizations:     Attends Club or Organization Meetings:     Marital Status:    Intimate Partner Violence:     Fear of Current or Ex-Partner:     Emotionally Abused:     Physically Abused:     Sexually Abused:      History reviewed. No pertinent family history. Skin: negative  Musculoskeletal: right shoulder pain  Neurologic: negative  Cardiovascular: negative    SUBJECTIVE:      Constitutional:    The patient is alert and oriented x 3, appears to be stated age and in no distress. Right upper extremity: Incision well-healed. No erythema or signs of infection. No swelling. Positive tenderness over the anterior aspect of the shoulder. Positive tenderness over the scapular musculature. Forward flexion 150 degrees. Internal rotation to lateral buttocks. Median, ulnar, radial nerves intact light touch. Brisk capillary refill. Xrays: Xray: x-rays of the right shoulder were obtained today in the office and reviewed with the patient. 3 views: AP/scapular Y-view/axial : demonstrate stable right reverse TSA.  No acute fractures or dislocations  Impression: stable right reverse TSA     Radiographic findings reviewed with patient      Impression:   4 month post op of right reverse total shoulder arthroplasty with acute right shoulder pain    Plan:   Discussed findings with patient. Did discuss he should not be lifting 150 pounds after reverse total shoulder replacement. Explained to the patient weight lifting restrictions. Recommended anti-inflammatories for acute flareup. Patient to contact the office if this does not improve. All questions answered. I have seen and evaluated the patient and agree with the above assessment and plan on today's visit. I have performed the key components of the history and physical examination with significant findings of 4 months postop right reverse total shoulder arthroplasty. Patient reports his shoulder pain is anterior and posterior. No significant findings on x-ray. Patient was again advised not to overuse the shoulder as he places himself at increased risk for early prosthetic wear and or injuries to the shoulder girdle. . I concur with the findings and plan as documented.     Malou Polo MD  7/20/2021

## 2021-12-31 ENCOUNTER — HOSPITAL ENCOUNTER (OUTPATIENT)
Age: 76
Setting detail: OBSERVATION
Discharge: HOME | End: 2022-01-02
Attending: STUDENT IN AN ORGANIZED HEALTH CARE EDUCATION/TRAINING PROGRAM | Admitting: INTERNAL MEDICINE
Payer: MEDICARE

## 2021-12-31 ENCOUNTER — APPOINTMENT (OUTPATIENT)
Dept: GENERAL RADIOLOGY | Age: 76
End: 2021-12-31
Payer: MEDICARE

## 2021-12-31 DIAGNOSIS — R07.9 CHEST PAIN, UNSPECIFIED TYPE: Primary | ICD-10-CM

## 2021-12-31 PROBLEM — R79.89 ELEVATED TROPONIN: Status: ACTIVE | Noted: 2021-12-31

## 2021-12-31 PROBLEM — R77.8 ELEVATED TROPONIN: Status: ACTIVE | Noted: 2021-12-31

## 2021-12-31 LAB
ANION GAP SERPL CALCULATED.3IONS-SCNC: 10 MMOL/L (ref 7–16)
BASOPHILS ABSOLUTE: 0.07 E9/L (ref 0–0.2)
BASOPHILS RELATIVE PERCENT: 1.2 % (ref 0–2)
BUN BLDV-MCNC: 16 MG/DL (ref 6–23)
CALCIUM SERPL-MCNC: 9.2 MG/DL (ref 8.6–10.2)
CHLORIDE BLD-SCNC: 106 MMOL/L (ref 98–107)
CO2: 23 MMOL/L (ref 22–29)
CREAT SERPL-MCNC: 1.2 MG/DL (ref 0.7–1.2)
EOSINOPHILS ABSOLUTE: 0.16 E9/L (ref 0.05–0.5)
EOSINOPHILS RELATIVE PERCENT: 2.8 % (ref 0–6)
GFR AFRICAN AMERICAN: >60
GFR NON-AFRICAN AMERICAN: 59 ML/MIN/1.73
GLUCOSE BLD-MCNC: 90 MG/DL (ref 74–99)
HCT VFR BLD CALC: 45.4 % (ref 37–54)
HEMOGLOBIN: 14.9 G/DL (ref 12.5–16.5)
IMMATURE GRANULOCYTES #: 0.03 E9/L
IMMATURE GRANULOCYTES %: 0.5 % (ref 0–5)
LYMPHOCYTES ABSOLUTE: 1.37 E9/L (ref 1.5–4)
LYMPHOCYTES RELATIVE PERCENT: 24 % (ref 20–42)
MCH RBC QN AUTO: 29.6 PG (ref 26–35)
MCHC RBC AUTO-ENTMCNC: 32.8 % (ref 32–34.5)
MCV RBC AUTO: 90.1 FL (ref 80–99.9)
MONOCYTES ABSOLUTE: 0.6 E9/L (ref 0.1–0.95)
MONOCYTES RELATIVE PERCENT: 10.5 % (ref 2–12)
NEUTROPHILS ABSOLUTE: 3.47 E9/L (ref 1.8–7.3)
NEUTROPHILS RELATIVE PERCENT: 61 % (ref 43–80)
PDW BLD-RTO: 12.8 FL (ref 11.5–15)
PLATELET # BLD: 216 E9/L (ref 130–450)
PMV BLD AUTO: 9.7 FL (ref 7–12)
POTASSIUM REFLEX MAGNESIUM: 4.1 MMOL/L (ref 3.5–5)
PRO-BNP: 37 PG/ML (ref 0–450)
RBC # BLD: 5.04 E12/L (ref 3.8–5.8)
REASON FOR REJECTION: NORMAL
REJECTED TEST: NORMAL
SODIUM BLD-SCNC: 139 MMOL/L (ref 132–146)
TROPONIN, HIGH SENSITIVITY: 42 NG/L (ref 0–11)
TROPONIN, HIGH SENSITIVITY: 47 NG/L (ref 0–11)
TROPONIN, HIGH SENSITIVITY: 59 NG/L (ref 0–11)
TROPONIN, HIGH SENSITIVITY: 65 NG/L (ref 0–11)
WBC # BLD: 5.7 E9/L (ref 4.5–11.5)

## 2021-12-31 PROCEDURE — 83880 ASSAY OF NATRIURETIC PEPTIDE: CPT

## 2021-12-31 PROCEDURE — 85025 COMPLETE CBC W/AUTO DIFF WBC: CPT

## 2021-12-31 PROCEDURE — 96372 THER/PROPH/DIAG INJ SC/IM: CPT

## 2021-12-31 PROCEDURE — 99285 EMERGENCY DEPT VISIT HI MDM: CPT

## 2021-12-31 PROCEDURE — 2580000003 HC RX 258: Performed by: NURSE PRACTITIONER

## 2021-12-31 PROCEDURE — 71045 X-RAY EXAM CHEST 1 VIEW: CPT

## 2021-12-31 PROCEDURE — 36415 COLL VENOUS BLD VENIPUNCTURE: CPT

## 2021-12-31 PROCEDURE — G0378 HOSPITAL OBSERVATION PER HR: HCPCS

## 2021-12-31 PROCEDURE — 6360000002 HC RX W HCPCS: Performed by: NURSE PRACTITIONER

## 2021-12-31 PROCEDURE — 80048 BASIC METABOLIC PNL TOTAL CA: CPT

## 2021-12-31 PROCEDURE — 84484 ASSAY OF TROPONIN QUANT: CPT

## 2021-12-31 PROCEDURE — 93005 ELECTROCARDIOGRAM TRACING: CPT | Performed by: STUDENT IN AN ORGANIZED HEALTH CARE EDUCATION/TRAINING PROGRAM

## 2021-12-31 RX ORDER — ZINC SULFATE 50(220)MG
50 CAPSULE ORAL DAILY
Status: DISCONTINUED | OUTPATIENT
Start: 2022-01-01 | End: 2022-01-02 | Stop reason: HOSPADM

## 2021-12-31 RX ORDER — CHOLECALCIFEROL (VITAMIN D3) 50 MCG
2000 TABLET ORAL DAILY
Status: DISCONTINUED | OUTPATIENT
Start: 2022-01-01 | End: 2022-01-02 | Stop reason: HOSPADM

## 2021-12-31 RX ORDER — SODIUM CHLORIDE 9 MG/ML
25 INJECTION, SOLUTION INTRAVENOUS PRN
Status: DISCONTINUED | OUTPATIENT
Start: 2021-12-31 | End: 2022-01-02 | Stop reason: HOSPADM

## 2021-12-31 RX ORDER — HYDRALAZINE HYDROCHLORIDE 20 MG/ML
5 INJECTION INTRAMUSCULAR; INTRAVENOUS EVERY 6 HOURS PRN
Status: DISCONTINUED | OUTPATIENT
Start: 2021-12-31 | End: 2022-01-02 | Stop reason: HOSPADM

## 2021-12-31 RX ORDER — POLYETHYLENE GLYCOL 3350 17 G/17G
17 POWDER, FOR SOLUTION ORAL DAILY PRN
Status: DISCONTINUED | OUTPATIENT
Start: 2021-12-31 | End: 2022-01-02 | Stop reason: HOSPADM

## 2021-12-31 RX ORDER — MULTIVITAMIN WITH IRON
1 TABLET ORAL DAILY
Status: DISCONTINUED | OUTPATIENT
Start: 2022-01-01 | End: 2022-01-02 | Stop reason: HOSPADM

## 2021-12-31 RX ORDER — PROCHLORPERAZINE EDISYLATE 5 MG/ML
5 INJECTION INTRAMUSCULAR; INTRAVENOUS EVERY 6 HOURS PRN
Status: DISCONTINUED | OUTPATIENT
Start: 2021-12-31 | End: 2022-01-02 | Stop reason: HOSPADM

## 2021-12-31 RX ORDER — NITROGLYCERIN 0.4 MG/1
0.4 TABLET SUBLINGUAL EVERY 5 MIN PRN
Status: DISCONTINUED | OUTPATIENT
Start: 2021-12-31 | End: 2022-01-02 | Stop reason: HOSPADM

## 2021-12-31 RX ORDER — SODIUM CHLORIDE 0.9 % (FLUSH) 0.9 %
5-40 SYRINGE (ML) INJECTION EVERY 12 HOURS SCHEDULED
Status: DISCONTINUED | OUTPATIENT
Start: 2021-12-31 | End: 2022-01-02 | Stop reason: HOSPADM

## 2021-12-31 RX ORDER — SODIUM CHLORIDE 0.9 % (FLUSH) 0.9 %
5-40 SYRINGE (ML) INJECTION PRN
Status: DISCONTINUED | OUTPATIENT
Start: 2021-12-31 | End: 2022-01-02 | Stop reason: HOSPADM

## 2021-12-31 RX ADMIN — ENOXAPARIN SODIUM 40 MG: 100 INJECTION SUBCUTANEOUS at 22:08

## 2021-12-31 RX ADMIN — Medication 10 ML: at 22:07

## 2021-12-31 ASSESSMENT — PAIN SCALES - GENERAL
PAINLEVEL_OUTOF10: 0
PAINLEVEL_OUTOF10: 0

## 2021-12-31 NOTE — ED NOTES
Bed: 18B-18  Expected date:   Expected time:   Means of arrival:   Comments:  Leopold Banter, RN  12/31/21 8141

## 2021-12-31 NOTE — ED PROVIDER NOTES
82-year-old male presenting with chest pain beginning this morning. Symptoms have been moderate,, no exacerbating or relieving factors. Patient states he was sitting and reading when he had sudden onset of sharp midsternal chest pain that radiated up the right side of his neck. Pain lasted approximately 15 to 20 minutes and was mostly resolved by the time EMS arrived. EMS gave him aspirin and nitro and symptoms resolved. He states he was feeling somewhat disoriented when the pain was occurring. He denies any recent fevers, chills, shortness of breath, nausea, emesis, or diarrhea. He has never had a stress test.           Review of Systems   Constitutional: Negative for chills and fever. HENT: Negative for congestion and sore throat. Eyes: Negative for photophobia and visual disturbance. Respiratory: Negative for cough and shortness of breath. Cardiovascular: Positive for chest pain. Gastrointestinal: Negative for abdominal pain and nausea. Genitourinary: Negative for dysuria and flank pain. Musculoskeletal: Negative for back pain and myalgias. Skin: Negative for rash and wound. Neurological: Negative for dizziness, light-headedness and headaches. Physical Exam  Constitutional:       General: He is not in acute distress. Appearance: He is not ill-appearing or toxic-appearing. HENT:      Head: Normocephalic and atraumatic. Right Ear: External ear normal.      Left Ear: External ear normal.      Nose: Nose normal.   Eyes:      Conjunctiva/sclera: Conjunctivae normal.   Cardiovascular:      Rate and Rhythm: Normal rate and regular rhythm. Pulmonary:      Effort: Pulmonary effort is normal.      Breath sounds: Normal breath sounds. Abdominal:      General: There is no distension. Palpations: Abdomen is soft. Tenderness: There is no abdominal tenderness. Musculoskeletal:         General: No swelling or deformity. Skin:     General: Skin is warm and dry. Neurological:      General: No focal deficit present. Mental Status: He is alert. Psychiatric:         Mood and Affect: Mood normal.         Behavior: Behavior normal.          Procedures     MDM  Number of Diagnoses or Management Options  Chest pain, unspecified type  Diagnosis management comments: 82-year-old male presenting for chest pain. No acute ischemic change on EKG. Initial troponin 42, repeat 65. Vital signs stable. Chest x-ray normal.  Due to elevated troponin in the setting of chest pain and patient never having stress test, decision is made to admit patient for cardiac evaluation. Patient was admitted to medicine in stable condition. Amount and/or Complexity of Data Reviewed  Clinical lab tests: reviewed         ED Course as of 12/31/21 2009   Fri Dec 31, 2021   2008 RDW: 12.8 [AP]      ED Course User Index  [AP] Joby Cuba MD        ED Course as of 12/31/21 2009   Fri Dec 31, 2021   2008 RDW: 12.8 [AP]      ED Course User Index  [AP] Joby Cuba MD       --------------------------------------------- PAST HISTORY ---------------------------------------------  Past Medical History:  has a past medical history of Shoulder pain, right. Past Surgical History:  has a past surgical history that includes knee surgery (Bilateral); Rotator cuff repair (Right, 1990); Carpal tunnel release (Bilateral, 1990); Tonsillectomy; and shoulder surgery (Right, 3/24/2021). Social History:  reports that he quit smoking about 40 years ago. His smoking use included cigarettes. He has a 20.00 pack-year smoking history. He has never used smokeless tobacco. He reports current alcohol use. He reports that he does not use drugs. Family History: family history is not on file. The patients home medications have been reviewed. Allergies: Patient has no known allergies.     -------------------------------------------------- RESULTS -------------------------------------------------    LABS:  Results for orders placed or performed during the hospital encounter of 12/31/21   CBC auto differential   Result Value Ref Range    WBC 5.7 4.5 - 11.5 E9/L    RBC 5.04 3.80 - 5.80 E12/L    Hemoglobin 14.9 12.5 - 16.5 g/dL    Hematocrit 45.4 37.0 - 54.0 %    MCV 90.1 80.0 - 99.9 fL    MCH 29.6 26.0 - 35.0 pg    MCHC 32.8 32.0 - 34.5 %    RDW 12.8 11.5 - 15.0 fL    Platelets 138 314 - 719 E9/L    MPV 9.7 7.0 - 12.0 fL    Neutrophils % 61.0 43.0 - 80.0 %    Immature Granulocytes % 0.5 0.0 - 5.0 %    Lymphocytes % 24.0 20.0 - 42.0 %    Monocytes % 10.5 2.0 - 12.0 %    Eosinophils % 2.8 0.0 - 6.0 %    Basophils % 1.2 0.0 - 2.0 %    Neutrophils Absolute 3.47 1.80 - 7.30 E9/L    Immature Granulocytes # 0.03 E9/L    Lymphocytes Absolute 1.37 (L) 1.50 - 4.00 E9/L    Monocytes Absolute 0.60 0.10 - 0.95 E9/L    Eosinophils Absolute 0.16 0.05 - 0.50 E9/L    Basophils Absolute 0.07 0.00 - 0.20 E9/L   Brain Natriuretic Peptide   Result Value Ref Range    Pro-BNP 37 0 - 450 pg/mL   SPECIMEN REJECTION   Result Value Ref Range    Rejected Test CMPX TRP5     Reason for Rejection see below    Troponin   Result Value Ref Range    Troponin, High Sensitivity 42 (H) 0 - 11 ng/L   Basic Metabolic Panel w/ Reflex to MG   Result Value Ref Range    Sodium 139 132 - 146 mmol/L    Potassium reflex Magnesium 4.1 3.5 - 5.0 mmol/L    Chloride 106 98 - 107 mmol/L    CO2 23 22 - 29 mmol/L    Anion Gap 10 7 - 16 mmol/L    Glucose 90 74 - 99 mg/dL    BUN 16 6 - 23 mg/dL    CREATININE 1.2 0.7 - 1.2 mg/dL    GFR Non-African American 59 >=60 mL/min/1.73    GFR African American >60     Calcium 9.2 8.6 - 10.2 mg/dL   Troponin   Result Value Ref Range    Troponin, High Sensitivity 65 (H) 0 - 11 ng/L   Troponin   Result Value Ref Range    Troponin, High Sensitivity 59 (H) 0 - 11 ng/L       RADIOLOGY:  XR CHEST PORTABLE   Final Result   No radiographic evidence for acute pulmonary process. NM Cardiac Stress Test Nuclear Imaging    (Results Pending)       EKG: This EKG is signed and interpreted by me. Rate: 70  Rhythm: Sinus  Interpretation: no acute ST-T changes, normal intervals  Comparison: no previous EKG available      ------------------------- NURSING NOTES AND VITALS REVIEWED ---------------------------  Date / Time Roomed:  12/31/2021 11:59 AM  ED Bed Assignment:  [id number]    The nursing notes within the ED encounter and vital signs as below have been reviewed. Patient Vitals for the past 24 hrs:   BP Temp Temp src Pulse Resp SpO2 Height Weight   12/31/21 1851 (!) 161/85 97.3 °F (36.3 °C) Temporal 71 16 96 % 5' 8\" (1.727 m) 196 lb 3.2 oz (89 kg)   12/31/21 1815 (!) 166/88   65 16 97 %     12/31/21 1221    61 12 96 %     12/31/21 1200 (!) 144/89 98.2 °F (36.8 °C)  65 18 97 %  185 lb (83.9 kg)       Oxygen Saturation Interpretation: Normal    ------------------------------------------ PROGRESS NOTES ------------------------------------------    Counseling:  I have spoken with the patient and discussed todays results, in addition to providing specific details for the plan of care and counseling regarding the diagnosis and prognosis. Their questions are answered at this time and they are agreeable with the plan of admission.    --------------------------------- ADDITIONAL PROVIDER NOTES ---------------------------------    This patient's ED course included: a personal history and physicial examination, re-evaluation prior to disposition, multiple bedside re-evaluations, cardiac monitoring and continuous pulse oximetry    This patient has remained hemodynamically stable during their ED course. Diagnosis:  1. Chest pain, unspecified type        Disposition:  Patient's disposition: Admit to telemetry  Patient's condition is stable.             Krista Callejas MD  Resident  12/31/21 2009

## 2022-01-01 ENCOUNTER — APPOINTMENT (OUTPATIENT)
Dept: NUCLEAR MEDICINE | Age: 77
End: 2022-01-01
Payer: MEDICARE

## 2022-01-01 LAB
ALBUMIN SERPL-MCNC: 3.7 G/DL (ref 3.5–5.2)
ALP BLD-CCNC: 72 U/L (ref 40–129)
ALT SERPL-CCNC: 20 U/L (ref 0–40)
ANION GAP SERPL CALCULATED.3IONS-SCNC: 8 MMOL/L (ref 7–16)
AST SERPL-CCNC: 22 U/L (ref 0–39)
BASOPHILS ABSOLUTE: 0.06 E9/L (ref 0–0.2)
BASOPHILS RELATIVE PERCENT: 1.1 % (ref 0–2)
BILIRUB SERPL-MCNC: 0.3 MG/DL (ref 0–1.2)
BUN BLDV-MCNC: 15 MG/DL (ref 6–23)
CALCIUM SERPL-MCNC: 8.8 MG/DL (ref 8.6–10.2)
CHLORIDE BLD-SCNC: 107 MMOL/L (ref 98–107)
CHOLESTEROL, TOTAL: 148 MG/DL (ref 0–199)
CO2: 25 MMOL/L (ref 22–29)
CREAT SERPL-MCNC: 1.2 MG/DL (ref 0.7–1.2)
EOSINOPHILS ABSOLUTE: 0.19 E9/L (ref 0.05–0.5)
EOSINOPHILS RELATIVE PERCENT: 3.6 % (ref 0–6)
GFR AFRICAN AMERICAN: >60
GFR NON-AFRICAN AMERICAN: 59 ML/MIN/1.73
GLUCOSE BLD-MCNC: 98 MG/DL (ref 74–99)
HCT VFR BLD CALC: 43.5 % (ref 37–54)
HDLC SERPL-MCNC: 41 MG/DL
HEMOGLOBIN: 14.4 G/DL (ref 12.5–16.5)
IMMATURE GRANULOCYTES #: 0.02 E9/L
IMMATURE GRANULOCYTES %: 0.4 % (ref 0–5)
LDL CHOLESTEROL CALCULATED: 86 MG/DL (ref 0–99)
LYMPHOCYTES ABSOLUTE: 1.3 E9/L (ref 1.5–4)
LYMPHOCYTES RELATIVE PERCENT: 24.6 % (ref 20–42)
MCH RBC QN AUTO: 30 PG (ref 26–35)
MCHC RBC AUTO-ENTMCNC: 33.1 % (ref 32–34.5)
MCV RBC AUTO: 90.6 FL (ref 80–99.9)
MONOCYTES ABSOLUTE: 0.59 E9/L (ref 0.1–0.95)
MONOCYTES RELATIVE PERCENT: 11.2 % (ref 2–12)
NEUTROPHILS ABSOLUTE: 3.13 E9/L (ref 1.8–7.3)
NEUTROPHILS RELATIVE PERCENT: 59.1 % (ref 43–80)
PDW BLD-RTO: 13 FL (ref 11.5–15)
PLATELET # BLD: 194 E9/L (ref 130–450)
PMV BLD AUTO: 9.6 FL (ref 7–12)
POTASSIUM REFLEX MAGNESIUM: 4.2 MMOL/L (ref 3.5–5)
RBC # BLD: 4.8 E12/L (ref 3.8–5.8)
SODIUM BLD-SCNC: 140 MMOL/L (ref 132–146)
TOTAL PROTEIN: 6.2 G/DL (ref 6.4–8.3)
TRIGL SERPL-MCNC: 107 MG/DL (ref 0–149)
VLDLC SERPL CALC-MCNC: 21 MG/DL
WBC # BLD: 5.3 E9/L (ref 4.5–11.5)

## 2022-01-01 PROCEDURE — 80053 COMPREHEN METABOLIC PANEL: CPT

## 2022-01-01 PROCEDURE — 36415 COLL VENOUS BLD VENIPUNCTURE: CPT

## 2022-01-01 PROCEDURE — 93017 CV STRESS TEST TRACING ONLY: CPT

## 2022-01-01 PROCEDURE — 6360000002 HC RX W HCPCS: Performed by: INTERNAL MEDICINE

## 2022-01-01 PROCEDURE — 3430000000 HC RX DIAGNOSTIC RADIOPHARMACEUTICAL: Performed by: STUDENT IN AN ORGANIZED HEALTH CARE EDUCATION/TRAINING PROGRAM

## 2022-01-01 PROCEDURE — G0378 HOSPITAL OBSERVATION PER HR: HCPCS

## 2022-01-01 PROCEDURE — 85025 COMPLETE CBC W/AUTO DIFF WBC: CPT

## 2022-01-01 PROCEDURE — 96372 THER/PROPH/DIAG INJ SC/IM: CPT

## 2022-01-01 PROCEDURE — A9500 TC99M SESTAMIBI: HCPCS | Performed by: STUDENT IN AN ORGANIZED HEALTH CARE EDUCATION/TRAINING PROGRAM

## 2022-01-01 PROCEDURE — 99999 PR OFFICE/OUTPT VISIT,PROCEDURE ONLY: CPT | Performed by: INTERNAL MEDICINE

## 2022-01-01 PROCEDURE — 78452 HT MUSCLE IMAGE SPECT MULT: CPT

## 2022-01-01 PROCEDURE — 2580000003 HC RX 258: Performed by: NURSE PRACTITIONER

## 2022-01-01 PROCEDURE — 80061 LIPID PANEL: CPT

## 2022-01-01 PROCEDURE — 99205 OFFICE O/P NEW HI 60 MIN: CPT | Performed by: INTERNAL MEDICINE

## 2022-01-01 PROCEDURE — 6370000000 HC RX 637 (ALT 250 FOR IP): Performed by: INTERNAL MEDICINE

## 2022-01-01 RX ORDER — ASPIRIN 81 MG/1
81 TABLET ORAL DAILY
Status: DISCONTINUED | OUTPATIENT
Start: 2022-01-01 | End: 2022-01-02 | Stop reason: HOSPADM

## 2022-01-01 RX ORDER — NITROGLYCERIN 0.4 MG/1
0.4 TABLET SUBLINGUAL EVERY 5 MIN PRN
Status: DISCONTINUED | OUTPATIENT
Start: 2022-01-01 | End: 2022-01-02 | Stop reason: HOSPADM

## 2022-01-01 RX ORDER — ATORVASTATIN CALCIUM 40 MG/1
40 TABLET, FILM COATED ORAL NIGHTLY
Status: DISCONTINUED | OUTPATIENT
Start: 2022-01-01 | End: 2022-01-01

## 2022-01-01 RX ORDER — ATORVASTATIN CALCIUM 20 MG/1
20 TABLET, FILM COATED ORAL NIGHTLY
Status: DISCONTINUED | OUTPATIENT
Start: 2022-01-01 | End: 2022-01-02 | Stop reason: HOSPADM

## 2022-01-01 RX ADMIN — ENOXAPARIN SODIUM 90 MG: 100 INJECTION SUBCUTANEOUS at 20:54

## 2022-01-01 RX ADMIN — NITROGLYCERIN 1 INCH: 20 OINTMENT TOPICAL at 17:16

## 2022-01-01 RX ADMIN — Medication 10 ML: at 20:55

## 2022-01-01 RX ADMIN — ASPIRIN 81 MG: 81 TABLET, COATED ORAL at 17:16

## 2022-01-01 RX ADMIN — REGADENOSON 0.4 MG: 0.08 INJECTION, SOLUTION INTRAVENOUS at 10:02

## 2022-01-01 RX ADMIN — ATORVASTATIN CALCIUM 20 MG: 20 TABLET, FILM COATED ORAL at 20:54

## 2022-01-01 RX ADMIN — Medication 11.8 MILLICURIE: at 09:01

## 2022-01-01 RX ADMIN — Medication 10 ML: at 09:00

## 2022-01-01 RX ADMIN — Medication 34 MILLICURIE: at 10:05

## 2022-01-01 ASSESSMENT — PAIN SCALES - GENERAL
PAINLEVEL_OUTOF10: 0

## 2022-01-01 NOTE — CONSULTS
Inpatient Cardiology Consultation      Reason for Consult:  CP. Elevated Troponin    Consulting Physician: Natalie Burkett MD    Requesting Physician:  Ariel Arias MD    Date of Consultation: 1/1/2022    HISTORY OF PRESENT ILLNESS: Substernal CP / Epigastric pain 12/31/21 am, sudden onset while sitting down, 5/10, no associated symptoms, lasted ~ 15 minutes. Wife called ambulance. Pain gone by the time they arrived. Was given nitro and aspirin. No recurrent pain since admission        Please note: past medical records were reviewed per electronic medical record (EMR) - see detailed reports under Past Medical/ Surgical History. Past Medical History:    Past Medical History:   Diagnosis Date    Shoulder pain, right 1989    For OR 3-24-21       Past Surgical History:    Past Surgical History:   Procedure Laterality Date    CARPAL TUNNEL RELEASE Bilateral 1990    KNEE SURGERY Bilateral     ROTATOR CUFF REPAIR Right 1990    SHOULDER SURGERY Right 3/24/2021    RIGHT REVERSE TOTAL SHOULDER ARTHROPLASTY WITH ISB performed by Pete Lao MD at [place] [address]         Medications Prior to admit:  Prior to Admission medications    Medication Sig Start Date End Date Taking?  Authorizing Provider   Multiple Vitamins-Minerals (MULTIVITAMIN ADULT PO) Take 1 capsule by mouth daily   Yes Historical Provider, MD   ZINC PO Take 1 capsule by mouth daily   Yes Historical Provider, MD   Cholecalciferol (VITAMIN D3) 50 MCG (2000 UT) CAPS Take 1 capsule by mouth daily   Yes Historical Provider, MD       Current Medications:    Current Facility-Administered Medications: regadenoson (LEXISCAN) injection 0.4 mg, 0.4 mg, IntraVENous, ONCE PRN  aspirin EC tablet 81 mg, 81 mg, Oral, Daily  nitroGLYCERIN (NITROSTAT) SL tablet 0.4 mg, 0.4 mg, SubLINGual, Q5 Min PRN  nitroglycerin (NITRO-BID) 2 % ointment 1 inch, 1 inch, Topical, 4 times per day  atorvastatin (LIPITOR) tablet 40 mg, 40 mg, Oral, Nightly  enoxaparin (LOVENOX) injection 90 mg, 1 mg/kg, SubCUTAneous, BID  sodium chloride flush 0.9 % injection 5-40 mL, 5-40 mL, IntraVENous, 2 times per day  sodium chloride flush 0.9 % injection 5-40 mL, 5-40 mL, IntraVENous, PRN  0.9 % sodium chloride infusion, 25 mL, IntraVENous, PRN  polyethylene glycol (GLYCOLAX) packet 17 g, 17 g, Oral, Daily PRN  prochlorperazine (COMPAZINE) injection 5 mg, 5 mg, IntraVENous, Q6H PRN  nitroGLYCERIN (NITROSTAT) SL tablet 0.4 mg, 0.4 mg, SubLINGual, Q5 Min PRN  vitamin D (CHOLECALCIFEROL) tablet 2,000 Units, 2,000 Units, Oral, Daily  multivitamin 1 tablet, 1 tablet, Oral, Daily  zinc sulfate (ZINCATE) capsule 50 mg, 50 mg, Oral, Daily  hydrALAZINE (APRESOLINE) injection 5 mg, 5 mg, IntraVENous, Q6H PRN    Allergies:  Patient has no known allergies. Social History:    Social History     Socioeconomic History    Marital status:      Spouse name: Not on file    Number of children: Not on file    Years of education: Not on file    Highest education level: Not on file   Occupational History    Not on file   Tobacco Use    Smoking status: Former Smoker     Packs/day: 1.00     Years: 20.00     Pack years: 20.00     Types: Cigarettes     Quit date: 18     Years since quittin.0    Smokeless tobacco: Never Used   Substance and Sexual Activity    Alcohol use: Yes     Comment: rarely    Drug use: No    Sexual activity: Not on file   Other Topics Concern    Not on file   Social History Narrative    Not on file     Social Determinants of Health     Financial Resource Strain:     Difficulty of Paying Living Expenses: Not on file   Food Insecurity:     Worried About [address] in the Last Year: Not on file    [address] in the Last Year: Not on file   Transportation Needs:     Lack of Transportation (Medical): Not on file    Lack of Transportation (Non-Medical):  Not on file   Physical Activity:     Days of Exercise per Week: Not on file    Minutes of Exercise per Session: Not on file   Stress:     Feeling of Stress : Not on file   Social Connections:     Frequency of Communication with Friends and Family: Not on file    Frequency of Social Gatherings with Friends and Family: Not on file    Attends Buddhism Services: Not on file    Active Member of [address] [place] [address] or Organizations: Not on file    Attends Club or Organization Meetings: Not on file    Marital Status: Not on file   Intimate Partner Violence:     Fear of Current or Ex-Partner: Not on file    Emotionally Abused: Not on file    Physically Abused: Not on file    Sexually Abused: Not on file   Housing Stability:     Unable to Pay for Housing in the Last Year: Not on file    Number of [place] in the Last Year: Not on file    Unstable Housing in the Last Year: Not on file       Family History:   Father  ~age 80, Alzheimer's. Mother  from cancer, was in her 66's      REVIEW OF SYSTEMS:     · Constitutional: Denies fatigue, fevers, chills or night sweats  · Eyes: Denies visual changes or drainage  · ENT: Denies headaches or hearing loss. No mouth sores or sore throat. No epistaxis   · Cardiovascular: Denies chest pain, pressure or palpitations. No lower extremity swelling. · Respiratory: Denies FRASER, cough, orthopnea or PND. No hemoptysis   · Gastrointestinal: Denies hematemesis or anorexia. No hematochezia or melena    · Genitourinary: Denies urgency, dysuria or hematuria. · Musculoskeletal: Denies gait disturbance, weakness or joint complaints  · Integumentary: Denies rash, hives or pruritis   · Neurological: Denies dizziness, headaches or seizures. No numbness or tingling  · Psychiatric: Denies anxiety or depression. · Endocrine: Denies temperature intolerance. No recent weight change. .  · Hematologic/Lymphatic: Denies abnormal bruising or bleeding.  No swollen lymph nodes    PHYSICAL EXAM:   BP (!) 151/72   Pulse 70   Temp 97.8 °F (36.6 °C) (Temporal)   Resp 16   Ht 5' 8\" (1.727 m)   Wt 190 lb 6 oz (86.4 kg)   SpO2 94%   BMI 28.95 kg/m²   CONST:  Well developed, well nourished  male who appears of stated age. Awake, alert and cooperative. No apparent distress. HEENT:   Head- Normocephalic, atraumatic   Eyes- Conjunctivae pink, anicteric  Throat- Oral mucosa pink and moist  Neck-  No stridor, trachea midline, no jugular venous distention. No carotid bruit. CHEST: Chest symmetrical and non-tender to palpation. No accessory muscle use or intercostal retractions  RESPIRATORY: Lung sounds - clear throughout fields   CARDIOVASCULAR:     Heart Inspection- shows no noted pulsations  Heart Palpation- no heaves or thrills; PMI is non-displaced   Heart Ausculation- Regular rate and rhythm, no murmur. No s3, s4 or rub   PV: No lower extremity edema. No varicosities. Pedal pulses palpable, no clubbing or cyanosis   ABDOMEN: Soft, non-tender to light palpation. Bowel sounds present. No palpable masses no organomegaly; no abdominal bruit  MS: Good muscle strength and tone. No atrophy or abnormal movements. : Deferred  SKIN: Warm and dry no statis dermatitis or ulcers   NEURO / PSYCH: Oriented to person, place and time. Speech clear and appropriate. Follows all commands. Pleasant affect     DATA:    ECG: Sinus Rhythm and is within normal limits  Tele strips: SR in the low 60's  Diagnostic:      Intake/Output Summary (Last 24 hours) at 1/1/2022 1012  Last data filed at 1/1/2022 0415  Gross per 24 hour   Intake 480 ml   Output 1050 ml   Net -570 ml       Labs:   CBC:   Recent Labs     12/31/21  1235 01/01/22  0536   WBC 5.7 5.3   HGB 14.9 14.4   HCT 45.4 43.5    194     BMP:   Recent Labs     12/31/21  1353 01/01/22  0536    140   K 4.1 4.2   CO2 23 25   BUN 16 15   CREATININE 1.2 1.2   LABGLOM 59 59   CALCIUM 9.2 8.8     Mag: No results for input(s): MG in the last 72 hours. Phos: No results for input(s): PHOS in the last 72 hours.   TFT: No results found for: TSH, N9KOGYA, D6HYYBD, THYROIDAB, FT3, T4FREE   HgA1c: No results found for: LABA1C  No results found for: EAG  proBNP:   Recent Labs     12/31/21  1235   PROBNP 37     PT/INR: No results for input(s): PROTIME, INR in the last 72 hours. APTT:No results for input(s): APTT in the last 72 hours. CARDIAC ENZYMES:  Recent Labs     12/31/21  1353 12/31/21  1521 12/31/21  1754 12/31/21 2023   TROPHS 42* 65* 59* 47*     FASTING LIPID PANEL:  Lab Results   Component Value Date    CHOL 148 01/01/2022    HDL 41 01/01/2022    LDLCALC 86 01/01/2022    TRIG 107 01/01/2022     LIVER PROFILE:  Recent Labs     01/01/22  0536   AST 22   ALT 20   LABALBU 3.7       CXR 12/31/21:  No radiographic evidence for acute pulmonary process. ASSESSMENT:  1. CP wth elevated Troponin levels suggestive / consistent with NSTEMI. No acute ECG changes however and no recurrent CP since the initial episode  2. SCr = 1.2 / GFR = 59: ? REGGIE vs CKD  3. Elevated BP. Denies H/o HTN    PLAN:   1. Will review stress test, ordered by the primary service  2. Start Aspirin, nitropaste, statins and therapeutic dose Lovenox  3. Will hold on BB therapy in view of the relatively low HR  4. Low threshold for Cath  5.  Will follow    Electronically signed by Osmani Randolph MD on 1/1/2022 at 10:27 AM

## 2022-01-01 NOTE — PROCEDURES
Lexiscan MPS  No CP  No ischemic ECG changes  Nuclear images reported separately    Electronically signed by Elizabeth Mckeon MD on 1/1/2022 at 10:09 AM

## 2022-01-01 NOTE — H&P
[address] [facility]  History and Physical      CHIEF COMPLAINT:    Chief Complaint   Patient presents with    Chest Pain     starting at 11am, resolved upon arrival, given 324mg asa and 1 nitro by ems        Patient of Ynes Jain DO presents with:  Chest pain    History of Present Illness: The patient reports that yesterday morning he was watching TV when he developed less than an hour duration of sharp anterior nonradiating chest pain which improved without clear intervention or modifying factors. He has never experienced this before has no prior history of CAD. No swallowing difficulties or heartburn. No recent heavy lifting or injuries. No other associated symptoms and currently he is chest pain-free. REVIEW OF SYSTEMS:  Pertinent negatives are above in HPI. 10 point ROS otherwise negative. Past Medical History:   Diagnosis Date    Shoulder pain, right 1989    For OR 3-24-21         Past Surgical History:   Procedure Laterality Date    CARPAL TUNNEL RELEASE Bilateral 1990    KNEE SURGERY Bilateral     ROTATOR CUFF REPAIR Right 1990    SHOULDER SURGERY Right 3/24/2021    RIGHT REVERSE TOTAL SHOULDER ARTHROPLASTY WITH ISB performed by Day Burgess MD at [facility]         Medications Prior to Admission:    Medications Prior to Admission: Multiple Vitamins-Minerals (MULTIVITAMIN ADULT PO), Take 1 capsule by mouth daily  ZINC PO, Take 1 capsule by mouth daily  Cholecalciferol (VITAMIN D3) 50 MCG (2000 UT) CAPS, Take 1 capsule by mouth daily    Note that the patient's home medications were reviewed and the above list is accurate to the best of my knowledge at the time of the exam.    Allergies:    Patient has no known allergies. Social History:    reports that he quit smoking about 40 years ago. His smoking use included cigarettes. He has a 20.00 pack-year smoking history. He has never used smokeless tobacco. He reports current alcohol use.  He reports that he does not use drugs. Family History:   No fhx CAD      PHYSICAL EXAM:    Vitals:  /72   Pulse 65   Temp 97.9 °F (36.6 °C) (Temporal)   Resp 14   Ht 5' 8\" (1.727 m)   Wt 190 lb 6 oz (86.4 kg)   SpO2 96%   BMI 28.95 kg/m²       General appearance: NAD, conversant  Eyes: Sclerae anicteric, PERRLA  HEENT: AT/NC, MMM  Neck: FROM, supple, no thyromegaly  Lymph: No cervical / supraclavicular lymphadenopathy  Lungs: Clear to auscultation, WOB normal  CV: RRR, no MRGs, no lower extremity edema  Abdomen: Soft, non-tender; no masses or HSM, +BS  Extremities: FROM without synovitis. No clubbing or cyanosis of the hands. Skin: no rash, induration, lesions, or ulcers  Psych: Calm and cooperative. Normal judgement and insight. Normal mood and affect. Neuro: Alert and interactive, face symmetric, speech fluent. LABS:  All labs reviewed.   Of note:  CBC with Differential:    Lab Results   Component Value Date    WBC 5.3 01/01/2022    RBC 4.80 01/01/2022    HGB 14.4 01/01/2022    HCT 43.5 01/01/2022     01/01/2022    MCV 90.6 01/01/2022    MCH 30.0 01/01/2022    MCHC 33.1 01/01/2022    RDW 13.0 01/01/2022    LYMPHOPCT 24.6 01/01/2022    MONOPCT 11.2 01/01/2022    BASOPCT 1.1 01/01/2022    MONOSABS 0.59 01/01/2022    LYMPHSABS 1.30 01/01/2022    EOSABS 0.19 01/01/2022    BASOSABS 0.06 01/01/2022     CMP:    Lab Results   Component Value Date     01/01/2022    K 4.2 01/01/2022     01/01/2022    CO2 25 01/01/2022    BUN 15 01/01/2022    CREATININE 1.2 01/01/2022    GFRAA >60 01/01/2022    LABGLOM 59 01/01/2022    GLUCOSE 98 01/01/2022    PROT 6.2 01/01/2022    LABALBU 3.7 01/01/2022    CALCIUM 8.8 01/01/2022    BILITOT 0.3 01/01/2022    ALKPHOS 72 01/01/2022    AST 22 01/01/2022    ALT 20 01/01/2022       Imaging:  I've personally reviewed the patient's CXR: clear    EKG:  I've personally reviewed the patient's EKG:  Despite my repeated and continuous requests for 2+ years, the ED EKG is not uploaded, and is not in his physical chart. Appears to have been lost      ASSESSMENT/PLAN:  Principal Problem:    Chest pain  Active Problems:    Elevated troponin  Resolved Problems:    * No resolved hospital problems.  *      EKG    Stress test     DVT prophylaxis: Enoxaparin  Code status: full  Requires obs level of care  Yani De Oliveira MD    8:27 AM  1/1/2022

## 2022-01-02 VITALS
TEMPERATURE: 97 F | DIASTOLIC BLOOD PRESSURE: 87 MMHG | BODY MASS INDEX: 28.85 KG/M2 | HEIGHT: 68 IN | RESPIRATION RATE: 16 BRPM | OXYGEN SATURATION: 96 % | WEIGHT: 190.38 LBS | SYSTOLIC BLOOD PRESSURE: 139 MMHG | HEART RATE: 68 BPM

## 2022-01-02 LAB
LV EF: 73 %
LVEF MODALITY: NORMAL

## 2022-01-02 PROCEDURE — 2580000003 HC RX 258: Performed by: NURSE PRACTITIONER

## 2022-01-02 PROCEDURE — 99225 PR SBSQ OBSERVATION CARE/DAY 25 MINUTES: CPT | Performed by: INTERNAL MEDICINE

## 2022-01-02 PROCEDURE — 6370000000 HC RX 637 (ALT 250 FOR IP): Performed by: NURSE PRACTITIONER

## 2022-01-02 PROCEDURE — 78452 HT MUSCLE IMAGE SPECT MULT: CPT | Performed by: INTERNAL MEDICINE

## 2022-01-02 PROCEDURE — 93016 CV STRESS TEST SUPVJ ONLY: CPT | Performed by: INTERNAL MEDICINE

## 2022-01-02 PROCEDURE — 6370000000 HC RX 637 (ALT 250 FOR IP): Performed by: INTERNAL MEDICINE

## 2022-01-02 PROCEDURE — 6360000002 HC RX W HCPCS: Performed by: INTERNAL MEDICINE

## 2022-01-02 PROCEDURE — 93018 CV STRESS TEST I&R ONLY: CPT | Performed by: INTERNAL MEDICINE

## 2022-01-02 PROCEDURE — 96372 THER/PROPH/DIAG INJ SC/IM: CPT

## 2022-01-02 PROCEDURE — G0378 HOSPITAL OBSERVATION PER HR: HCPCS

## 2022-01-02 RX ORDER — METOPROLOL SUCCINATE 25 MG/1
25 TABLET, EXTENDED RELEASE ORAL DAILY
Qty: 30 TABLET | Refills: 3 | Status: SHIPPED | OUTPATIENT
Start: 2022-01-03

## 2022-01-02 RX ORDER — ISOSORBIDE MONONITRATE 30 MG/1
30 TABLET, EXTENDED RELEASE ORAL DAILY
Status: DISCONTINUED | OUTPATIENT
Start: 2022-01-02 | End: 2022-01-02 | Stop reason: HOSPADM

## 2022-01-02 RX ORDER — ASPIRIN 81 MG/1
81 TABLET ORAL DAILY
Qty: 30 TABLET | Refills: 3 | Status: SHIPPED | OUTPATIENT
Start: 2022-01-03

## 2022-01-02 RX ORDER — METOPROLOL SUCCINATE 25 MG/1
25 TABLET, EXTENDED RELEASE ORAL DAILY
Status: DISCONTINUED | OUTPATIENT
Start: 2022-01-02 | End: 2022-01-02 | Stop reason: HOSPADM

## 2022-01-02 RX ORDER — NITROGLYCERIN 0.4 MG/1
TABLET SUBLINGUAL
Qty: 25 TABLET | Refills: 3 | Status: SHIPPED | OUTPATIENT
Start: 2022-01-02

## 2022-01-02 RX ORDER — ATORVASTATIN CALCIUM 10 MG/1
10 TABLET, FILM COATED ORAL NIGHTLY
Qty: 30 TABLET | Refills: 3 | Status: SHIPPED | OUTPATIENT
Start: 2022-01-02

## 2022-01-02 RX ADMIN — Medication 50 MG: at 09:43

## 2022-01-02 RX ADMIN — METOPROLOL SUCCINATE 25 MG: 25 TABLET, FILM COATED, EXTENDED RELEASE ORAL at 09:49

## 2022-01-02 RX ADMIN — ENOXAPARIN SODIUM 90 MG: 100 INJECTION SUBCUTANEOUS at 09:50

## 2022-01-02 RX ADMIN — NITROGLYCERIN 1 INCH: 20 OINTMENT TOPICAL at 00:12

## 2022-01-02 RX ADMIN — Medication 10 ML: at 09:44

## 2022-01-02 RX ADMIN — Medication 1 TABLET: at 09:42

## 2022-01-02 RX ADMIN — ISOSORBIDE MONONITRATE 30 MG: 30 TABLET, EXTENDED RELEASE ORAL at 09:50

## 2022-01-02 RX ADMIN — ASPIRIN 81 MG: 81 TABLET, COATED ORAL at 09:44

## 2022-01-02 RX ADMIN — NITROGLYCERIN 1 INCH: 20 OINTMENT TOPICAL at 05:53

## 2022-01-02 RX ADMIN — Medication 2000 UNITS: at 09:43

## 2022-01-02 ASSESSMENT — PAIN SCALES - GENERAL
PAINLEVEL_OUTOF10: 0
PAINLEVEL_OUTOF10: 0

## 2022-01-02 NOTE — DISCHARGE SUMMARY
Physician Discharge Summary     Patient ID:  Emily Robertson  [id number]  78 y.o.  1945    Admit date: 12/31/2021    Discharge date and time:  1/2/2022     Admission Diagnoses:   Chief Complaint   Patient presents with    Chest Pain     starting at 11am, resolved upon arrival, given 324mg asa and 1 nitro by ems      Chest pain     Discharge Diagnoses:   Principal Problem:    Chest pain  Active Problems:    Elevated troponin  Resolved Problems:    * No resolved hospital problems. *       Consults: cardiology    Procedures:   Nuclear stress test  1. The myocardial perfusion imaging was normal.   2. Gated SPECT left ventricular ejection fraction was calculated to be   73% with normal myocardial wall motion. Hospital Course:   Patient presented with a short-lived episode of chest pain. His high-sensitivity troponin was minimally elevated into the 60s. Cardiology saw him and felt he should be started on therapeutic Lovenox. He then underwent a stress test which did not show reversible ischemia. Cardiology would like him on medical management although I am not sure this is conclusively CAD at this point. He has been started on aspirin, beta-blocker, and a low-dose statin. He has remained chest pain-free throughout his time here.      Discharge Exam:  Vitals:    01/01/22 2245 01/02/22 0500 01/02/22 0800 01/02/22 1103   BP: 127/63 132/68 121/70 139/87   Pulse: 69 64 67 68   Resp: 14 14 16 16   Temp: 98 °F (36.7 °C) 97.2 °F (36.2 °C) 97.1 °F (36.2 °C) 97 °F (36.1 °C)   TempSrc: Temporal Temporal Temporal Temporal   SpO2: 94% 94% 94% 96%   Weight:  190 lb 6 oz (86.4 kg)     Height:            General appearance: NAD, conversant  HEENT: AT/NC, MMM  Neck: FROM, supple  Lungs: Clear to auscultation, WOB normal  CV: RRR, no MRGs  Abdomen: Soft, non-tender; no masses or HSM, +BS  Extremities: No peripheral edema or digital cyanosis  Skin: no rash, lesions or ulcers  Psych: Calm and cooperative  Neuro: Alert and interactive, nonfocal     Condition:  Stable    Disposition: home    Patient Instructions:   Current Discharge Medication List      START taking these medications    Details   aspirin 81 MG EC tablet Take 1 tablet by mouth daily  Qty: 30 tablet, Refills: 3      nitroGLYCERIN (NITROSTAT) 0.4 MG SL tablet up to max of 3 total doses. If no relief after 1 dose, call 911. Qty: 25 tablet, Refills: 3      atorvastatin (LIPITOR) 10 MG tablet Take 1 tablet by mouth nightly  Qty: 30 tablet, Refills: 3      metoprolol succinate (TOPROL XL) 25 MG extended release tablet Take 1 tablet by mouth daily  Qty: 30 tablet, Refills: 3         CONTINUE these medications which have NOT CHANGED    Details   Multiple Vitamins-Minerals (MULTIVITAMIN ADULT PO) Take 1 capsule by mouth daily      ZINC PO Take 1 capsule by mouth daily      Cholecalciferol (VITAMIN D3) 50 MCG (2000 UT) CAPS Take 1 capsule by mouth daily           Activity: activity as tolerated  Diet: regular diet    Follow-up with PCP in 1 week.     Note that over 30 minutes was spent in preparing discharge papers, discussing discharge with patient, medication review, etc.    Signed:  Rick Emerson MD    1/2/2022  12:03 PM

## 2022-01-02 NOTE — PROGRESS NOTES
Inpatient Cardiology Progress note     PATIENT IS BEING FOLLOWED FOR: CRUZ    Crystal Cardozo. is a 78 y.o. male seen in consultation by me 1/1/21     SUBJECTIVE: Denies CP or SOB  OBJECTIVE: No apparent distress     ROS:  Consist: Denies fevers, chills or night sweats  Heart: Denies chest pain, palpitations, lightheadedness, dizziness or syncope  Lungs: Denies SOB, cough, wheezing, orthopnea or PND  GI: Denies abdominal pain, vomiting or diarrhea    PHYSICAL EXAM:   /68   Pulse 64   Temp 97.2 °F (36.2 °C) (Temporal)   Resp 14   Ht 5' 8\" (1.727 m)   Wt 190 lb 6 oz (86.4 kg)   SpO2 94%   BMI 28.95 kg/m²    B/P Range last 24 hours: Systolic (95POK), DAGO:264 , Min:127 , DVX:617    Diastolic (91UWP), TOREY:33, Min:63, Max:85    CONST: Well developed, well nourished male who appears of stated age. Awake, alert and cooperative. No apparent distress  HEENT:   Head- Normocephalic, atraumatic   Eyes- Conjunctivae pink, anicteric  Throat- Oral mucosa pink and moist  Neck-  No stridor, trachea midline, no jugular venous distention. No carotid bruit  CHEST: Chest symmetrical and non-tender to palpation. No accessory muscle use or intercostal retractions  RESPIRATORY:  Lung sounds - clear throughout fields   CARDIOVASCULAR:     Heart Inspection- shows no noted pulsations  Heart Palpation- no heaves or thrills; PMI is non-displaced   Heart Ausculation- Regular rate and rhythm, no murmur. No s3, s4 or rub   PV: No lower extremity edema. No varicosities. Pedal pulses palpable, no clubbing or cyanosis   ABDOMEN: Soft, non-tender to light palpation. Bowel sounds present. No palpable masses no organomegaly; no abdominal bruit  MS: Good muscle strength and tone. No atrophy or abnormal movements. : Deferred  SKIN: Warm and dry no statis dermatitis or ulcers   NEURO / PSYCH: Oriented to person, place and time. Speech clear and appropriate. Follows all commands.  Pleasant affect       Intake/Output Summary (Last 24 hours) at 1/2/2022 0902  Last data filed at 1/2/2022 0442  Gross per 24 hour   Intake 720 ml   Output 1750 ml   Net -1030 ml       Weight:   Wt Readings from Last 3 Encounters:   01/02/22 190 lb 6 oz (86.4 kg)   07/20/21 180 lb (81.6 kg)   04/29/21 180 lb (81.6 kg)     Current Inpatient Medications:   aspirin  81 mg Oral Daily    nitroglycerin  1 inch Topical 4 times per day    enoxaparin  1 mg/kg SubCUTAneous BID    atorvastatin  20 mg Oral Nightly    sodium chloride flush  5-40 mL IntraVENous 2 times per day    vitamin D  2,000 Units Oral Daily    multivitamin  1 tablet Oral Daily    zinc sulfate  50 mg Oral Daily       IV Infusions (if any):   sodium chloride         DIAGNOSTIC/ LABORATORY DATA:  Labs:   CBC:   Recent Labs     12/31/21  1235 01/01/22  0536   WBC 5.7 5.3   HGB 14.9 14.4   HCT 45.4 43.5    194     BMP:   Recent Labs     12/31/21  1353 01/01/22  0536    140   K 4.1 4.2   CO2 23 25   BUN 16 15   CREATININE 1.2 1.2   LABGLOM 59 59   CALCIUM 9.2 8.8     CARDIAC ENZYMES:  Recent Labs     12/31/21  1353 12/31/21  1521 12/31/21  1754 12/31/21 2023   TROPHS 42* 65* 59* 47*     FASTING LIPID PANEL:  Lab Results   Component Value Date    CHOL 148 01/01/2022    HDL 41 01/01/2022    LDLCALC 86 01/01/2022    TRIG 107 01/01/2022     LIVER PROFILE:  Recent Labs     01/01/22  0536   AST 22   ALT 20   LABALBU 3.7       CXR: No radiographic evidence for acute pulmonary process. Telemetry: SR    12 lead EKG: Sinus Rhythm and is within normal limits      Lexiscan MPS: pending ( could not be read because of reading station / computer failure )      ASSESSMENT:   1. CP wth elevated Troponin levels suggestive / consistent with NSTEMI. No acute ECG changes however and no recurrent CP since the initial episode  2. CKD: SCr = 1.2 / GFR = 59        PLAN:  1. Change nitropaste to Imdur  2. Start low dose BB therapy  3.  Further recommendations to follow pending results of the stress test    Electronically signed by Rody Rollins MD on 1/2/2022 at 9:02 AM     Addendum:   Betzy Ga MPS:  1. The myocardial perfusion imaging was normal.  2. Gated SPECT left ventricular ejection fraction was calculated to be  73% with normal myocardial wall motion.     OK for discharge from cardiology stand point    Electronically signed by Rody Rollins MD on 1/2/2022 at 11:26 AM

## 2022-01-03 LAB
EKG ATRIAL RATE: 70 BPM
EKG P AXIS: 34 DEGREES
EKG P-R INTERVAL: 148 MS
EKG Q-T INTERVAL: 368 MS
EKG QRS DURATION: 76 MS
EKG QTC CALCULATION (BAZETT): 397 MS
EKG R AXIS: 35 DEGREES
EKG T AXIS: 72 DEGREES
EKG VENTRICULAR RATE: 70 BPM

## 2022-01-04 ENCOUNTER — TELEPHONE (OUTPATIENT)
Dept: ADMINISTRATIVE | Age: 77
End: 2022-01-04

## 2022-01-07 ENCOUNTER — TELEPHONE (OUTPATIENT)
Dept: CARDIOLOGY CLINIC | Age: 77
End: 2022-01-07

## 2022-01-07 NOTE — TELEPHONE ENCOUNTER
[address] [facility] FOLLOW UP VISIT. PER DR HAYDEN   NO NEED FOR FOLLOW UP. I CALLED AND SPOKE TO STACEY AND TOLD HER THAT DR HAYDEN SAID THERE WAS NO NEED FOR CARDIOLOGY FOLLOW UP. I TOLD HER THAT TIANA WILL CONTINUE ON THE MEDICATIONS PRESCRIBED AND FOLLOW WITH PCP FOR REFILLS.   RLL

## 2022-01-18 ENCOUNTER — TELEPHONE (OUTPATIENT)
Dept: CARDIOLOGY CLINIC | Age: 77
End: 2022-01-18

## 2022-01-30 PROBLEM — R79.89 ELEVATED TROPONIN: Status: RESOLVED | Noted: 2021-12-31 | Resolved: 2022-01-30

## 2022-01-30 PROBLEM — R77.8 ELEVATED TROPONIN: Status: RESOLVED | Noted: 2021-12-31 | Resolved: 2022-01-30

## 2024-01-18 LAB
T4 FREE SERPL-MCNC: 1.1 NG/DL (ref 0.9–1.7)
TSH SERPL DL<=0.05 MIU/L-ACNC: 4.45 UIU/ML (ref 0.27–4.2)

## 2024-01-25 LAB — TSH RECEPTOR AB: <1.1 IU/L

## 2024-06-11 NOTE — PROGRESS NOTES
[address] [facility]   Phone: [phone]   Fax: [phone]      Physical Therapy Daily Treatment Note    Date: 2020  Patient Name: Naina Caicedo  :    MRN: [id number]  DOInjury: years  DOSx: NA   Referring Provider: Duane Smith MD  [address]     Medical Diagnosis:     OA L knee/R shoulder    Outcome Measure: Lower Extremity Functional Scale (LEFS) 20-39%% impairment                                      Quick Dash  40-59%    S: Pt reports that he would like to focus PT on shoulder pain. States that this is the majority of his limitations  O: Pt given written HEP  Time 249-566     Visit  Repeat outcome measure at mid point and end. Pain 8/10                       Modalities            Manual                  Stretch      Table slides      Wall Flexion      Wall ER stretch      Towel IR stretch      IR reaching behind back      Exercise      Shrugs AROM      Pendulum Ex      UBE 6 min  TE   Pulleys - flex/scap x10 10s holds ea  TE   Pulleys-IR      Supine wand chest press 2x10  TE   Supine wand flex x10 10s holds  TE   Supine wand ER/ x10 10s holds  TE   Supine flexion      S-lying ABD      S-lying ER      Standing wand flex      Standing flexion      Standing ABD            ROWS: H Functional activities  To aid in ROM and strength needed for reaching , lifting ,pushing and pulling at home/work    ROWS: M 3x10 3s holds \" GTB TA   ROWS: H 3x10 3s holds \" GTB TA   ER  \"    IR  \"    Shoulder extension 3x10 3s holds GTB TA         A:  Tolerated well. Above added to written HEP.   P: Continue with rehab plan  Neida Moreno, PT DPT, PT [id number]    Treatment Charges: Mins Units   Initial Evaluation     Re-Evaluation     Ther Exercise         TE 30 2   Manual Therapy     MT     Ther Activities        TA 10 1   Gait Training          GT     Neuro Re-education NR     Modalities     Non-Billable Service Time     Other     Total Time/Units 40 3
Risk Assessment Explanation (Does Not Render In The Note): Clinical determination of the probability and/or consequences of an event, such as surgery. Clinical assessment of the level of risk is affected by the nature of the event under consideration for the patient. Modifier 57 is used to indicate an Evaluation and Management (E/M) service resulted in the initial decision to perform surgery either the day before a major surgery (90 day global) or the day of a major surgery.
Discussion: Decision for surgery refers to any surgeries performed today, or discussion of treatment in the future. In general, decision for repair is not made until the final extent of the surgical wound is known.
Initial Decision For Surgery?: Yes
Complexity (Necessary For Coding; Major - 90 Day Global With Some Exceptions; Minor - 10 Day Global): minor
Date Of Surgery - Today Or Tomorrow?: today

## 2024-12-11 ENCOUNTER — OFFICE VISIT (OUTPATIENT)
Dept: CARDIOLOGY CLINIC | Age: 79
End: 2024-12-11
Payer: MEDICARE

## 2024-12-11 VITALS
SYSTOLIC BLOOD PRESSURE: 118 MMHG | BODY MASS INDEX: 29.1 KG/M2 | HEIGHT: 68 IN | DIASTOLIC BLOOD PRESSURE: 60 MMHG | WEIGHT: 192 LBS | HEART RATE: 56 BPM | RESPIRATION RATE: 20 BRPM

## 2024-12-11 DIAGNOSIS — R00.1 SINUS BRADYCARDIA: Primary | ICD-10-CM

## 2024-12-11 DIAGNOSIS — I45.10 RBBB (RIGHT BUNDLE BRANCH BLOCK): ICD-10-CM

## 2024-12-11 DIAGNOSIS — R79.89 ELEVATED TSH: ICD-10-CM

## 2024-12-11 DIAGNOSIS — N18.2 STAGE 2 CHRONIC KIDNEY DISEASE: ICD-10-CM

## 2024-12-11 PROCEDURE — 99215 OFFICE O/P EST HI 40 MIN: CPT | Performed by: INTERNAL MEDICINE

## 2024-12-11 PROCEDURE — 1159F MED LIST DOCD IN RCRD: CPT | Performed by: INTERNAL MEDICINE

## 2024-12-11 PROCEDURE — 1160F RVW MEDS BY RX/DR IN RCRD: CPT | Performed by: INTERNAL MEDICINE

## 2024-12-11 PROCEDURE — 93000 ELECTROCARDIOGRAM COMPLETE: CPT | Performed by: INTERNAL MEDICINE

## 2024-12-11 PROCEDURE — 1123F ACP DISCUSS/DSCN MKR DOCD: CPT | Performed by: INTERNAL MEDICINE

## 2024-12-11 NOTE — PROGRESS NOTES
with cardiology in 1 year or on as-needed basis      Addendum: Blood test from his PCPs office from 9/24/2024 obtained and scanned in epic:   WBC 4.9 hemoglobin 15.7 hematocrit 47.5 platelet count 198 glucose 85 BUN 15 creatinine 1.22 GFR 61 sodium 141 potassium 4.6 LFTs normal TSH 4.86 total cholesterol 128 triglycerides 66 HDL 52 LDL 63 PSA 2.69     45 minutes of time was spent in today's encounter reviewing medical records, obtaining and reviewing recent laboratory data, obtaining history and physical, making recommendations and in preparing today's document    [facility]  [address] [place] [id number] [facility]. [facility] [address] [phone]

## (undated) DEVICE — 4-PORT MANIFOLD: Brand: NEPTUNE 2

## (undated) DEVICE — Z INACTIVE USE 2660664 SOLUTION IRRIG 3000ML 0.9% SOD CHL USP UROMATIC PLAS CONT

## (undated) DEVICE — TRAY SYSTEM 7 DRILL REUSABLE

## (undated) DEVICE — STRIP,CLOSURE,WOUND,MEDI-STRIP,1/2X4: Brand: MEDLINE

## (undated) DEVICE — DRAPE,U/ SHT,SPLIT,PLAS,STERIL: Brand: MEDLINE

## (undated) DEVICE — 2108 SERIES SAGITTAL BLADE, OFFSET (20.0 X 0.89 X 80.0MM)

## (undated) DEVICE — CONVERTORS STOCKINETTE: Brand: CONVERTORS

## (undated) DEVICE — TUBING, SUCTION, 9/32" X 10', STRAIGHT: Brand: MEDLINE

## (undated) DEVICE — Device

## (undated) DEVICE — GLOVE SURG SZ 85 L12IN FNGR THK13MIL WHT ISOLEX POLYISOPRENE

## (undated) DEVICE — TRAY LAMBOTS REUSABLE

## (undated) DEVICE — PACK PROCEDURE SURG GEN CUST

## (undated) DEVICE — SOLUTION IV IRRIG WATER 1000ML POUR BRL 2F7114

## (undated) DEVICE — BIT DRL DIA2.7MM PERIPH SCR REUSE FOR COMPHSVE REV SHLDR

## (undated) DEVICE — SYRINGE MED 20ML STD CLR PLAS LUERSLIP TIP N CTRL DISP

## (undated) DEVICE — TUBE IRRIG HNDPC HI FLO TP INTRPULS W/SUCTION TUBE

## (undated) DEVICE — SYRINGE IRRIG 60ML SFT PLIABLE BLB EZ TO GRP 1 HND USE W/

## (undated) DEVICE — CHLORAPREP 26ML ORANGE

## (undated) DEVICE — SET ORTHO STD STORTSTD1

## (undated) DEVICE — SUTURE N ABSRB 2-0 W/CRV TAPR NDL BLK BLU MAXBRAID CM0222N

## (undated) DEVICE — GLOVE SURG SZ 6 THK91MIL LTX FREE SYN POLYISOPRENE ANTI

## (undated) DEVICE — PACK,SHOULDER SPLIT: Brand: MEDLINE

## (undated) DEVICE — SHOECOVER ANTI-SKID: Brand: CARDINAL HEALTH

## (undated) DEVICE — BASIC SINGLE BASIN 1-LF: Brand: MEDLINE INDUSTRIES, INC.

## (undated) DEVICE — ELECTRODE PT RET AD L9FT HI MOIST COND ADH HYDRGEL CORDED

## (undated) DEVICE — Z DISCONTINUED PER MEDLINE USE 2741943 DRESSING AQUACEL 10 IN ALG W9XL25CM SIL CVR WTRPRF VIR BACT BARR ANTIMIC

## (undated) DEVICE — PAD,ABDOMINAL,5"X9",ST,LF,25/BX: Brand: MEDLINE INDUSTRIES, INC.

## (undated) DEVICE — ANTISEPTIC 16OZ H PEROX 1ST AID ORAL DEBRIDING AGNT

## (undated) DEVICE — GLOVE SURG SZ 9 THK91MIL LTX FREE SYN POLYISOPRENE ANTI

## (undated) DEVICE — INSTRUMENT CURRETTES REUSABLE

## (undated) DEVICE — INTENDED FOR TISSUE SEPARATION, AND OTHER PROCEDURES THAT REQUIRE A SHARP SURGICAL BLADE TO PUNCTURE OR CUT.: Brand: BARD-PARKER ® STAINLESS STEEL BLADES

## (undated) DEVICE — 3M™ STERI-DRAPE™ U-DRAPE 1015: Brand: STERI-DRAPE™

## (undated) DEVICE — T-MAX DISPOSABLE FACE MASK 8 PER BOX

## (undated) DEVICE — SOLUTION IV IRRIG POUR BRL 0.9% SODIUM CHL 2F7124

## (undated) DEVICE — SCISSORS SUT CUTS W/O FRAYED EDG FIBERWIRE

## (undated) DEVICE — 3M™ IOBAN™ 2 ANTIMICROBIAL INCISE DRAPE 6650EZ: Brand: IOBAN™ 2

## (undated) DEVICE — DOUBLE BASIN SET: Brand: MEDLINE INDUSTRIES, INC.

## (undated) DEVICE — CONTROL SYRINGE LUER-LOCK TIP: Brand: MONOJECT

## (undated) DEVICE — BIT DRL DIA2.9MM FOR SFT ANCHR SHT SHFT PK JUGGERKNOT

## (undated) DEVICE — SYRINGE, LUER LOCK, 10ML: Brand: MEDLINE

## (undated) DEVICE — IMMOBILIZER SHLDR L10.5-17IN D7IN SLNG W/ 15DEG ABD PLLW

## (undated) DEVICE — TRAY SPIDER ARM POSITIONER REUSABLE

## (undated) DEVICE — GAUZE,SPONGE,4"X4",8PLY,STRL,LF,10/TRAY: Brand: MEDLINE

## (undated) DEVICE — GLOVE SURG SZ 65 THK91MIL LTX FREE SYN POLYISOPRENE

## (undated) DEVICE — GOWN,SIRUS,FABRNF,XL,20/CS: Brand: MEDLINE

## (undated) DEVICE — SHOULDER STABILIZATION KIT,                                    DISPOSABLE 12 PER BOX

## (undated) DEVICE — ADHESIVE SKIN CLSR 0.7ML TOP DERMBND ADV

## (undated) DEVICE — INSTRUMENT SYSTEM 7 BATTERY REUSABLE

## (undated) DEVICE — 1000 S-DRAPE TOWEL DRAPE 10/BX: Brand: STERI-DRAPE™

## (undated) DEVICE — 3M™ COBAN™ NL STERILE NON-LATEX SELF-ADHERENT WRAP, 2084S, 4 IN X 5 YD (10 CM X 4,5 M), 18 ROLLS/CASE: Brand: 3M™ COBAN™